# Patient Record
Sex: FEMALE | Race: BLACK OR AFRICAN AMERICAN | NOT HISPANIC OR LATINO | URBAN - METROPOLITAN AREA
[De-identification: names, ages, dates, MRNs, and addresses within clinical notes are randomized per-mention and may not be internally consistent; named-entity substitution may affect disease eponyms.]

---

## 2023-10-02 ENCOUNTER — INPATIENT (INPATIENT)
Facility: HOSPITAL | Age: 43
LOS: 3 days | Discharge: HOME | End: 2023-10-06
Attending: INTERNAL MEDICINE | Admitting: INTERNAL MEDICINE
Payer: COMMERCIAL

## 2023-10-02 VITALS — WEIGHT: 238.1 LBS | HEIGHT: 70 IN

## 2023-10-02 DIAGNOSIS — R93.0 ABNORMAL FINDINGS ON DIAGNOSTIC IMAGING OF SKULL AND HEAD, NOT ELSEWHERE CLASSIFIED: ICD-10-CM

## 2023-10-02 DIAGNOSIS — I25.10 ATHEROSCLEROTIC HEART DISEASE OF NATIVE CORONARY ARTERY WITHOUT ANGINA PECTORIS: ICD-10-CM

## 2023-10-02 DIAGNOSIS — R55 SYNCOPE AND COLLAPSE: ICD-10-CM

## 2023-10-02 DIAGNOSIS — R09.89 OTHER SPECIFIED SYMPTOMS AND SIGNS INVOLVING THE CIRCULATORY AND RESPIRATORY SYSTEMS: ICD-10-CM

## 2023-10-02 DIAGNOSIS — E78.5 HYPERLIPIDEMIA, UNSPECIFIED: ICD-10-CM

## 2023-10-02 DIAGNOSIS — Z86.718 PERSONAL HISTORY OF OTHER VENOUS THROMBOSIS AND EMBOLISM: ICD-10-CM

## 2023-10-02 DIAGNOSIS — I10 ESSENTIAL (PRIMARY) HYPERTENSION: ICD-10-CM

## 2023-10-02 DIAGNOSIS — E11.65 TYPE 2 DIABETES MELLITUS WITH HYPERGLYCEMIA: ICD-10-CM

## 2023-10-02 DIAGNOSIS — R79.89 OTHER SPECIFIED ABNORMAL FINDINGS OF BLOOD CHEMISTRY: ICD-10-CM

## 2023-10-02 LAB
ALBUMIN SERPL ELPH-MCNC: 3.4 G/DL — SIGNIFICANT CHANGE UP (ref 3.3–5)
ALP SERPL-CCNC: 78 U/L — SIGNIFICANT CHANGE UP (ref 40–120)
ALT FLD-CCNC: 16 U/L — SIGNIFICANT CHANGE UP (ref 12–78)
ANION GAP SERPL CALC-SCNC: 6 MMOL/L — SIGNIFICANT CHANGE UP (ref 5–17)
APPEARANCE UR: CLEAR — SIGNIFICANT CHANGE UP
APTT BLD: 25 SEC — SIGNIFICANT CHANGE UP (ref 24.5–35.6)
AST SERPL-CCNC: 23 U/L — SIGNIFICANT CHANGE UP (ref 15–37)
BASOPHILS # BLD AUTO: 0.04 K/UL — SIGNIFICANT CHANGE UP (ref 0–0.2)
BASOPHILS NFR BLD AUTO: 0.6 % — SIGNIFICANT CHANGE UP (ref 0–2)
BILIRUB SERPL-MCNC: 0.2 MG/DL — SIGNIFICANT CHANGE UP (ref 0.2–1.2)
BILIRUB UR-MCNC: NEGATIVE — SIGNIFICANT CHANGE UP
BUN SERPL-MCNC: 13 MG/DL — SIGNIFICANT CHANGE UP (ref 7–23)
CALCIUM SERPL-MCNC: 8.9 MG/DL — SIGNIFICANT CHANGE UP (ref 8.5–10.1)
CHLORIDE SERPL-SCNC: 101 MMOL/L — SIGNIFICANT CHANGE UP (ref 96–108)
CK MB BLD-MCNC: 2.8 % — SIGNIFICANT CHANGE UP (ref 0–3.5)
CK MB BLD-MCNC: 3.4 % — SIGNIFICANT CHANGE UP (ref 0–3.5)
CK MB CFR SERPL CALC: 3 NG/ML — SIGNIFICANT CHANGE UP (ref 0.5–3.6)
CK MB CFR SERPL CALC: 3.8 NG/ML — HIGH (ref 0.5–3.6)
CK SERPL-CCNC: 107 U/L — SIGNIFICANT CHANGE UP (ref 26–192)
CK SERPL-CCNC: 113 U/L — SIGNIFICANT CHANGE UP (ref 26–192)
CO2 SERPL-SCNC: 25 MMOL/L — SIGNIFICANT CHANGE UP (ref 22–31)
COLOR SPEC: YELLOW — SIGNIFICANT CHANGE UP
CREAT SERPL-MCNC: 1.09 MG/DL — SIGNIFICANT CHANGE UP (ref 0.5–1.3)
DIFF PNL FLD: ABNORMAL
EGFR: 64 ML/MIN/1.73M2 — SIGNIFICANT CHANGE UP
EOSINOPHIL # BLD AUTO: 0.11 K/UL — SIGNIFICANT CHANGE UP (ref 0–0.5)
EOSINOPHIL NFR BLD AUTO: 1.5 % — SIGNIFICANT CHANGE UP (ref 0–6)
GLUCOSE BLDC GLUCOMTR-MCNC: 254 MG/DL — HIGH (ref 70–99)
GLUCOSE BLDC GLUCOMTR-MCNC: 276 MG/DL — HIGH (ref 70–99)
GLUCOSE BLDC GLUCOMTR-MCNC: 291 MG/DL — HIGH (ref 70–99)
GLUCOSE SERPL-MCNC: 359 MG/DL — HIGH (ref 70–99)
GLUCOSE UR QL: 1000 MG/DL
HCG SERPL-ACNC: <1 MIU/ML — SIGNIFICANT CHANGE UP
HCT VFR BLD CALC: 35.6 % — SIGNIFICANT CHANGE UP (ref 34.5–45)
HGB BLD-MCNC: 11.8 G/DL — SIGNIFICANT CHANGE UP (ref 11.5–15.5)
IMM GRANULOCYTES NFR BLD AUTO: 0.4 % — SIGNIFICANT CHANGE UP (ref 0–0.9)
INR BLD: 0.93 RATIO — SIGNIFICANT CHANGE UP (ref 0.85–1.18)
KETONES UR-MCNC: NEGATIVE — SIGNIFICANT CHANGE UP
LEUKOCYTE ESTERASE UR-ACNC: NEGATIVE — SIGNIFICANT CHANGE UP
LYMPHOCYTES # BLD AUTO: 1.69 K/UL — SIGNIFICANT CHANGE UP (ref 1–3.3)
LYMPHOCYTES # BLD AUTO: 23.2 % — SIGNIFICANT CHANGE UP (ref 13–44)
MCHC RBC-ENTMCNC: 29.6 PG — SIGNIFICANT CHANGE UP (ref 27–34)
MCHC RBC-ENTMCNC: 33.1 G/DL — SIGNIFICANT CHANGE UP (ref 32–36)
MCV RBC AUTO: 89.4 FL — SIGNIFICANT CHANGE UP (ref 80–100)
MONOCYTES # BLD AUTO: 0.55 K/UL — SIGNIFICANT CHANGE UP (ref 0–0.9)
MONOCYTES NFR BLD AUTO: 7.6 % — SIGNIFICANT CHANGE UP (ref 2–14)
NEUTROPHILS # BLD AUTO: 4.85 K/UL — SIGNIFICANT CHANGE UP (ref 1.8–7.4)
NEUTROPHILS NFR BLD AUTO: 66.7 % — SIGNIFICANT CHANGE UP (ref 43–77)
NITRITE UR-MCNC: NEGATIVE — SIGNIFICANT CHANGE UP
NRBC # BLD: 0 /100 WBCS — SIGNIFICANT CHANGE UP (ref 0–0)
NT-PROBNP SERPL-SCNC: 985 PG/ML — HIGH (ref 0–125)
PH UR: 5 — SIGNIFICANT CHANGE UP (ref 5–8)
PLATELET # BLD AUTO: 287 K/UL — SIGNIFICANT CHANGE UP (ref 150–400)
POTASSIUM SERPL-MCNC: 5 MMOL/L — SIGNIFICANT CHANGE UP (ref 3.5–5.3)
POTASSIUM SERPL-SCNC: 5 MMOL/L — SIGNIFICANT CHANGE UP (ref 3.5–5.3)
PROT SERPL-MCNC: 8 GM/DL — SIGNIFICANT CHANGE UP (ref 6–8.3)
PROT UR-MCNC: 100 MG/DL
PROTHROM AB SERPL-ACNC: 11.1 SEC — SIGNIFICANT CHANGE UP (ref 9.5–13)
RBC # BLD: 3.98 M/UL — SIGNIFICANT CHANGE UP (ref 3.8–5.2)
RBC # FLD: 12.5 % — SIGNIFICANT CHANGE UP (ref 10.3–14.5)
RBC CASTS # UR COMP ASSIST: NEGATIVE /HPF — SIGNIFICANT CHANGE UP (ref 0–4)
SODIUM SERPL-SCNC: 132 MMOL/L — LOW (ref 135–145)
SP GR SPEC: 1.02 — SIGNIFICANT CHANGE UP (ref 1.01–1.02)
TROPONIN I, HIGH SENSITIVITY RESULT: 321.3 NG/L — HIGH
TROPONIN I, HIGH SENSITIVITY RESULT: 91.5 NG/L — HIGH
UROBILINOGEN FLD QL: NEGATIVE MG/DL — SIGNIFICANT CHANGE UP
WBC # BLD: 7.27 K/UL — SIGNIFICANT CHANGE UP (ref 3.8–10.5)
WBC # FLD AUTO: 7.27 K/UL — SIGNIFICANT CHANGE UP (ref 3.8–10.5)
WBC UR QL: NEGATIVE — SIGNIFICANT CHANGE UP

## 2023-10-02 PROCEDURE — 99223 1ST HOSP IP/OBS HIGH 75: CPT

## 2023-10-02 PROCEDURE — 93010 ELECTROCARDIOGRAM REPORT: CPT

## 2023-10-02 PROCEDURE — 71045 X-RAY EXAM CHEST 1 VIEW: CPT | Mod: 26

## 2023-10-02 PROCEDURE — 99285 EMERGENCY DEPT VISIT HI MDM: CPT

## 2023-10-02 PROCEDURE — 93971 EXTREMITY STUDY: CPT | Mod: 26,LT

## 2023-10-02 PROCEDURE — 72125 CT NECK SPINE W/O DYE: CPT | Mod: 26,MA

## 2023-10-02 PROCEDURE — 70486 CT MAXILLOFACIAL W/O DYE: CPT | Mod: 26,MA

## 2023-10-02 PROCEDURE — 70450 CT HEAD/BRAIN W/O DYE: CPT | Mod: 26,MA

## 2023-10-02 PROCEDURE — 71275 CT ANGIOGRAPHY CHEST: CPT | Mod: 26

## 2023-10-02 RX ORDER — ACETAMINOPHEN 500 MG
650 TABLET ORAL EVERY 6 HOURS
Refills: 0 | Status: DISCONTINUED | OUTPATIENT
Start: 2023-10-02 | End: 2023-10-06

## 2023-10-02 RX ORDER — DEXTROSE 50 % IN WATER 50 %
25 SYRINGE (ML) INTRAVENOUS ONCE
Refills: 0 | Status: DISCONTINUED | OUTPATIENT
Start: 2023-10-02 | End: 2023-10-06

## 2023-10-02 RX ORDER — ENOXAPARIN SODIUM 100 MG/ML
40 INJECTION SUBCUTANEOUS EVERY 24 HOURS
Refills: 0 | Status: DISCONTINUED | OUTPATIENT
Start: 2023-10-02 | End: 2023-10-02

## 2023-10-02 RX ORDER — DEXTROSE 50 % IN WATER 50 %
15 SYRINGE (ML) INTRAVENOUS ONCE
Refills: 0 | Status: DISCONTINUED | OUTPATIENT
Start: 2023-10-02 | End: 2023-10-06

## 2023-10-02 RX ORDER — SODIUM CHLORIDE 9 MG/ML
1000 INJECTION, SOLUTION INTRAVENOUS
Refills: 0 | Status: DISCONTINUED | OUTPATIENT
Start: 2023-10-02 | End: 2023-10-06

## 2023-10-02 RX ORDER — LISINOPRIL 2.5 MG/1
40 TABLET ORAL DAILY
Refills: 0 | Status: DISCONTINUED | OUTPATIENT
Start: 2023-10-02 | End: 2023-10-04

## 2023-10-02 RX ORDER — ASPIRIN/CALCIUM CARB/MAGNESIUM 324 MG
1 TABLET ORAL
Refills: 0 | DISCHARGE

## 2023-10-02 RX ORDER — ENOXAPARIN SODIUM 100 MG/ML
110 INJECTION SUBCUTANEOUS EVERY 12 HOURS
Refills: 0 | Status: DISCONTINUED | OUTPATIENT
Start: 2023-10-02 | End: 2023-10-05

## 2023-10-02 RX ORDER — GLUCAGON INJECTION, SOLUTION 0.5 MG/.1ML
1 INJECTION, SOLUTION SUBCUTANEOUS ONCE
Refills: 0 | Status: DISCONTINUED | OUTPATIENT
Start: 2023-10-02 | End: 2023-10-06

## 2023-10-02 RX ORDER — ASPIRIN/CALCIUM CARB/MAGNESIUM 324 MG
81 TABLET ORAL DAILY
Refills: 0 | Status: DISCONTINUED | OUTPATIENT
Start: 2023-10-02 | End: 2023-10-06

## 2023-10-02 RX ORDER — METOPROLOL TARTRATE 50 MG
1 TABLET ORAL
Refills: 0 | DISCHARGE

## 2023-10-02 RX ORDER — SIMVASTATIN 20 MG/1
20 TABLET, FILM COATED ORAL AT BEDTIME
Refills: 0 | Status: DISCONTINUED | OUTPATIENT
Start: 2023-10-02 | End: 2023-10-06

## 2023-10-02 RX ORDER — INSULIN LISPRO 100/ML
VIAL (ML) SUBCUTANEOUS AT BEDTIME
Refills: 0 | Status: DISCONTINUED | OUTPATIENT
Start: 2023-10-02 | End: 2023-10-06

## 2023-10-02 RX ORDER — METOPROLOL TARTRATE 50 MG
50 TABLET ORAL DAILY
Refills: 0 | Status: DISCONTINUED | OUTPATIENT
Start: 2023-10-02 | End: 2023-10-06

## 2023-10-02 RX ORDER — INSULIN LISPRO 100/ML
VIAL (ML) SUBCUTANEOUS
Refills: 0 | Status: DISCONTINUED | OUTPATIENT
Start: 2023-10-02 | End: 2023-10-06

## 2023-10-02 RX ORDER — DEXTROSE 50 % IN WATER 50 %
12.5 SYRINGE (ML) INTRAVENOUS ONCE
Refills: 0 | Status: DISCONTINUED | OUTPATIENT
Start: 2023-10-02 | End: 2023-10-06

## 2023-10-02 RX ORDER — SODIUM CHLORIDE 9 MG/ML
1000 INJECTION, SOLUTION INTRAVENOUS
Refills: 0 | Status: DISCONTINUED | OUTPATIENT
Start: 2023-10-02 | End: 2023-10-02

## 2023-10-02 RX ORDER — LANOLIN ALCOHOL/MO/W.PET/CERES
3 CREAM (GRAM) TOPICAL AT BEDTIME
Refills: 0 | Status: DISCONTINUED | OUTPATIENT
Start: 2023-10-02 | End: 2023-10-06

## 2023-10-02 RX ORDER — LISINOPRIL 2.5 MG/1
1 TABLET ORAL
Refills: 0 | DISCHARGE

## 2023-10-02 RX ORDER — SIMVASTATIN 20 MG/1
1 TABLET, FILM COATED ORAL
Refills: 0 | DISCHARGE

## 2023-10-02 RX ADMIN — SIMVASTATIN 20 MILLIGRAM(S): 20 TABLET, FILM COATED ORAL at 23:51

## 2023-10-02 RX ADMIN — Medication 81 MILLIGRAM(S): at 19:40

## 2023-10-02 RX ADMIN — Medication 6: at 18:36

## 2023-10-02 RX ADMIN — ENOXAPARIN SODIUM 110 MILLIGRAM(S): 100 INJECTION SUBCUTANEOUS at 23:49

## 2023-10-02 RX ADMIN — Medication 50 MILLIGRAM(S): at 23:51

## 2023-10-02 RX ADMIN — LISINOPRIL 40 MILLIGRAM(S): 2.5 TABLET ORAL at 23:54

## 2023-10-02 NOTE — H&P ADULT - PROBLEM SELECTOR PLAN 4
CT head/maxillofacial/Cspine  ( I personally review) Noted indeterminate lucent lesion within the right side of the clivus and basiocciput with surrounding sclerotic change. Additional indeterminate expansile lucent lesion within the right parietal calvarium. Infection and neoplasm are within the differential diagnosis for both of these lesions. Metastatic lesions cannot be excluded  MRI brain/C spine with IV contrast, IV ativan for sedation. Patient has claustrophobia   Pending CTA chest to R/O PE  Consider hematology consult in AM CT head/maxillofacial/Cspine  ( I personally review) Noted indeterminate lucent lesion within the right side of the clivus and basiocciput with surrounding sclerotic change. Additional indeterminate expansile lucent lesion within the right parietal calvarium. Infection and neoplasm are within the differential diagnosis for both of these lesions. Metastatic lesions cannot be excluded  MRI brain/C spine with IV contrast, IV ativan for sedation. Patient has claustrophobia   Pending CTA chest to R/O PE, will be able to evaluate chest pathology as well  Consider hematology/oncology consult in AM

## 2023-10-02 NOTE — ED ADULT NURSE NOTE - ED STAT RN HANDOFF DETAILS
Report given to receiving RN Jordyn ,pts history, current condition and reason for admission discussed, safety concerns addressed and reviewed, pt currently in stable condition, IV flushes for patency and site shows no signs or symptoms of infiltrate, dressing is clean dry and intact, pt is aware of plan of care. Pt education deemed successful at time of report after patient demonstrates successful teach back for proficiency.

## 2023-10-02 NOTE — H&P ADULT - PROBLEM SELECTOR PLAN 3
glucose elevated at 359 in BMP  Patient is not checking FS, only on metformin 1g bid at home  Check A1c  ISS and hypoglycemia protocol  If persistently elevated or A1c is significantly elevated, will then add basal and bolus insulin glucose elevated at 359 in BMP  Patient is not checking FS, only on metformin 1g bid at home  Check A1c  ISS and hypoglycemia protocol  If persistently elevated glucose or A1c is significantly elevated, will then add basal and bolus insulin

## 2023-10-02 NOTE — H&P ADULT - PROBLEM SELECTOR PLAN 2
hsTnT 91.5  EKG with sinus tachy. ? septal infarct  No chest pain  Patient reported h/o nonobstructive CAD, had Cath in around [id number] at [facility] with 20% stenosis, no stent. Patient is following with outpatient cardiology and had normal exercise treadmill test in 2022 per patient  continue aspirin, statin, BB  serial trop/CK/CKMB, ECHO, telemetry monitoring

## 2023-10-02 NOTE — ED ADULT NURSE NOTE - OBJECTIVE STATEMENT
As per pt she was on her way to the dentist when she felt light headed, warm sensation over her body and syncopized to the floor hitting her head. Was assisted up but then fell again as she was entering her uber, feels more SOB than usual. Denies CP, denies headache but states feels tired than usual. Pt has hx of HTN and diabetes, stats compliant with medication, presents to ED AAOX4.

## 2023-10-02 NOTE — H&P ADULT - HISTORY OF PRESENT ILLNESS
43 years old female with h/o HTN, HLD, DM, CAD, h/o DVT 2007 ( due to OC pills, treated with coumadin x 6 months, d/c by hematology, obesity present to ED with complain of syncope x 2. While walking to dental appointment, patient felt blurry vision, lightheadedness, fell forward and had once syncope episode. Patient got up, walked short distance to her Uber, then had another episode. No chest pain, diaphoresis. Patient has polydipsia recently.  Tachycardic upon arrival, afebrile, sat well at [facility]. EKG with sinus tachy. ? septal infarct. No leukocytosis, plty 287, K 5, Cr 1.09, hsTnT 91.5. CXR with no focal consolidation. Left LE doppler negative for DVT. CT head/maxillofacial/Cspine with no acute fracture or intracranial hemorrhage. Noted indeterminate lucent lesion within the right side of the clivus and basiocciput with surrounding sclerotic change. Additional indeterminate expansile lucent lesion within the right parietal calvarium. Infection and neoplasm are within the differential diagnosis for both of these lesions. Metastatic lesions cannot be excluded    SH: occasional alcohol use  FH: HTN, DM

## 2023-10-02 NOTE — H&P ADULT - NSHPPHYSICALEXAM_GEN_ALL_CORE
CONSTITUTIONAL: alert and cooperative, no acute distress.   EYES: PERRL, no scleral icterus  ENT: Mucosa moist, tongue normal.  NECK: Neck supple, trachea midline, non-tender  CARDIAC: Normal S1 and S2. Regular rate and rhythms. No Pedal edema. Peripheral pulses intact  LUNGS: Equal air entry both lungs. No rales, rhonchi, wheezing. Normal respiratory effort.   ABDOMEN: Soft, nondistended, nontender. No guarding or rebound tenderness. No hepatomegaly or splenomegaly. Bowel sound normal.   MUSCULOSKELETAL: Normocephalic, atraumatic. No significant deformity or joint abnormality  NEUROLOGICAL: No gross motor or sensory deficits.  SKIN: no lesions or eruptions. Normal turgor  PSYCHIATRIC: A&O x 3, appropriate mood and affect

## 2023-10-02 NOTE — ED PROVIDER NOTE - CLINICAL SUMMARY MEDICAL DECISION MAKING FREE TEXT BOX
43 year old female w h/o htn, dm, mi, dvt (2007 due to bcp, no longer on anticoagulation) and left side heart failure presents today biba c/o syncope, pt states that she was going to her dentist appointment, just finished talking to her family on the phone when she started to experience blurry vision and leg weakness and syncopized, pt fell face down, was assisted up, as she was attempting to get into an Uber she experienced another syncopal episode, pt recalls touching the knob of the car then woke up with people asking if she was ok, pts bp elevated and blood sugar elevated with ems, pt admits to not taking her medications this morning and last ate at 9pm yesterday, +headache initially, now resolved, (-) dizziness +sob +nausea  (-) vomiting (-) chest pain (-) flu like symptoms (-) recent travel +chronic R leg swelling (due to prior DVT) (-) hormonal medications, on exam pt is alert and oriented x3, moving all extremities, able to follow commands and appears in no distress, DDX includes vasovagal event, arrhythmias, dehydration, severe anemia, ruptured ectopic pregnancy, PE, labs ordered, pt hydrated, cardiac monitoring, will reassess and dispo     labs reviewed, ct head negative, pregnance negative, ct angio positive for PE, Pt admitted for treatment and workup

## 2023-10-02 NOTE — ED ADULT NURSE NOTE - NSFALLRISKINTERV_ED_ALL_ED

## 2023-10-02 NOTE — ED PROVIDER NOTE - SECONDARY DIAGNOSIS.
Elevated troponin Pulmonary embolism Type 2 diabetes mellitus with hyperglycemia CAD (coronary artery disease) Hyperlipidemia, unspecified Benign essential HTN

## 2023-10-02 NOTE — H&P ADULT - ASSESSMENT
43 years old female with h/o HTN, HLD, DM, CAD, h/o DVT 2007 ( due to OC pills, treated with coumadin x 6 months, d/c by hematology, obesity present to ED with complain of syncope x 2. While walking to dental appointment, patient felt blurry vision, lightheadedness, fell forward and had once syncope episode. Patient got up, walked short distance to her Uber, then had another episode. No chest pain, diaphoresis. Patient has polydipsia recently.  Tachycardic upon arrival, afebrile, sat well at [facility]. EKG with sinus tachy. ? septal infarct. No leukocytosis, plty 287, K 5, Cr 1.09, hsTnT 91.5. CXR with no focal consolidation. Left LE doppler negative for DVT. CT head/maxillofacial/Cspine with no acute fracture or intracranial hemorrhage. Noted indeterminate lucent lesion within the right side of the clivus and basiocciput with surrounding sclerotic change. Additional indeterminate expansile lucent lesion within the right parietal calvarium. Infection and neoplasm are within the differential diagnosis for both of these lesions. Metastatic lesions cannot be excluded

## 2023-10-02 NOTE — H&P ADULT - PROBLEM SELECTOR PLAN 8
h/o left LE DVT 2007 due to OCP. Patient was following with hematologist. Completed 6 months of coumadin and was stopped by hematology

## 2023-10-02 NOTE — ED PROVIDER NOTE - OBJECTIVE STATEMENT
43 year old female presents today biba c/o syncope, pt states that she was going to her dentist appointment, just finished talking to her family on the phone when she started to feel 43 year old female w h/o htn, dm, mi, dvt (2007 due to bcp, no longer on anticoagulation) and left side heart failure presents today biba c/o syncope, pt states that she was going to her dentist appointment, just finished talking to her family on the phone when she started to experience blurry vision and leg weakness and syncopized, pt fell face down, was assisted up, as she was attempting to get into an Uber she experienced another syncopal episode, pt recalls touching the knob of the car then woke up with people asking if she was ok, pts bp elevated and blood sugar elevated with ems, pt admits to not taking her medications this morning and last ate at 9pm yesterday, +headache initially, now resolved, (-) dizziness +sob +nausea  (-) vomiting (-) chest pain (-) flu like symptoms (-) recent travel +chronic R leg swelling (due to prior DVT) (-) hormonal medications

## 2023-10-02 NOTE — H&P ADULT - PROBLEM SELECTOR PLAN 7
Patient reported h/o nonobstructive CAD, had Cath in around [id number] at [facility] with 20% stenosis, no stent. Patient is following with outpatient cardiology and had normal exercise treadmill test in 2022 per patient  continue aspirin, statin, BB

## 2023-10-02 NOTE — H&P ADULT - PROBLEM SELECTOR PLAN 1
present with syncope x 2   CT head/C spine  ( I personally review) with no acute pathology or intracranial pathology  Presentation suggestive of vasovagal  Check orthostatic vitals  CTA chest to R/O PE given h/o prior DVT  Gentle IV hydration, monitor for volume overload  Telemetry monitoring, ECHO present with syncope x 2   CT head/C spine  ( I personally review) with no acute pathology or intracranial pathology  Presentation suggestive of vasovagal  Check orthostatic vitals, if positive, will consider IV fluid. Will hold of IV fluid for now given elevated BNP  CTA chest to R/O PE given h/o prior DVT  Telemetry monitoring, ECHO

## 2023-10-02 NOTE — ED PROVIDER NOTE - CARE PLAN
1 Principal Discharge DX:	Syncope   Principal Discharge DX:	Syncope  Secondary Diagnosis:	Elevated troponin   Principal Discharge DX:	Syncope  Secondary Diagnosis:	Elevated troponin  Secondary Diagnosis:	Type 2 diabetes mellitus with hyperglycemia  Secondary Diagnosis:	Hyperlipidemia, unspecified  Secondary Diagnosis:	CAD (coronary artery disease)  Secondary Diagnosis:	Benign essential HTN  Secondary Diagnosis:	Pulmonary embolism

## 2023-10-03 DIAGNOSIS — I26.99 OTHER PULMONARY EMBOLISM WITHOUT ACUTE COR PULMONALE: ICD-10-CM

## 2023-10-03 LAB
A1C WITH ESTIMATED AVERAGE GLUCOSE RESULT: 12.3 % — HIGH (ref 4–5.6)
A1C WITH ESTIMATED AVERAGE GLUCOSE RESULT: 12.3 % — HIGH (ref 4–5.6)
ALBUMIN SERPL ELPH-MCNC: 3.2 G/DL — LOW (ref 3.3–5)
ALP SERPL-CCNC: 64 U/L — SIGNIFICANT CHANGE UP (ref 40–120)
ALT FLD-CCNC: 18 U/L — SIGNIFICANT CHANGE UP (ref 12–78)
ANION GAP SERPL CALC-SCNC: 9 MMOL/L — SIGNIFICANT CHANGE UP (ref 5–17)
APTT BLD: 31.7 SEC — SIGNIFICANT CHANGE UP (ref 24.5–35.6)
AST SERPL-CCNC: 22 U/L — SIGNIFICANT CHANGE UP (ref 15–37)
BILIRUB SERPL-MCNC: 0.3 MG/DL — SIGNIFICANT CHANGE UP (ref 0.2–1.2)
BUN SERPL-MCNC: 19 MG/DL — SIGNIFICANT CHANGE UP (ref 7–23)
CALCIUM SERPL-MCNC: 8.8 MG/DL — SIGNIFICANT CHANGE UP (ref 8.5–10.1)
CHLORIDE SERPL-SCNC: 99 MMOL/L — SIGNIFICANT CHANGE UP (ref 96–108)
CHOLEST SERPL-MCNC: 140 MG/DL — SIGNIFICANT CHANGE UP
CK MB BLD-MCNC: 2.6 % — SIGNIFICANT CHANGE UP (ref 0–3.5)
CK MB CFR SERPL CALC: 2.8 NG/ML — SIGNIFICANT CHANGE UP (ref 0.5–3.6)
CK SERPL-CCNC: 107 U/L — SIGNIFICANT CHANGE UP (ref 26–192)
CO2 SERPL-SCNC: 25 MMOL/L — SIGNIFICANT CHANGE UP (ref 22–31)
CREAT SERPL-MCNC: 1.28 MG/DL — SIGNIFICANT CHANGE UP (ref 0.5–1.3)
EGFR: 53 ML/MIN/1.73M2 — LOW
ESTIMATED AVERAGE GLUCOSE: 306 MG/DL — HIGH (ref 68–114)
ESTIMATED AVERAGE GLUCOSE: 306 MG/DL — HIGH (ref 68–114)
GLUCOSE BLDC GLUCOMTR-MCNC: 252 MG/DL — HIGH (ref 70–99)
GLUCOSE BLDC GLUCOMTR-MCNC: 255 MG/DL — HIGH (ref 70–99)
GLUCOSE BLDC GLUCOMTR-MCNC: 255 MG/DL — HIGH (ref 70–99)
GLUCOSE BLDC GLUCOMTR-MCNC: 288 MG/DL — HIGH (ref 70–99)
GLUCOSE BLDC GLUCOMTR-MCNC: 407 MG/DL — HIGH (ref 70–99)
GLUCOSE SERPL-MCNC: 287 MG/DL — HIGH (ref 70–99)
HCT VFR BLD CALC: 34.7 % — SIGNIFICANT CHANGE UP (ref 34.5–45)
HDLC SERPL-MCNC: 33 MG/DL — LOW
HGB BLD-MCNC: 11.3 G/DL — LOW (ref 11.5–15.5)
IGA FLD-MCNC: 323 MG/DL — SIGNIFICANT CHANGE UP (ref 84–499)
IGG FLD-MCNC: 1737 MG/DL — HIGH (ref 610–1660)
IGM SERPL-MCNC: 48 MG/DL — SIGNIFICANT CHANGE UP (ref 35–242)
KAPPA LC SER QL IFE: 6.21 MG/DL — HIGH (ref 0.33–1.94)
KAPPA/LAMBDA FREE LIGHT CHAIN RATIO, SERUM: 2.11 RATIO — HIGH (ref 0.26–1.65)
LAMBDA LC SER QL IFE: 2.95 MG/DL — HIGH (ref 0.57–2.63)
LIPID PNL WITH DIRECT LDL SERPL: 73 MG/DL — SIGNIFICANT CHANGE UP
MAGNESIUM SERPL-MCNC: 1.2 MG/DL — LOW (ref 1.6–2.6)
MCHC RBC-ENTMCNC: 29.1 PG — SIGNIFICANT CHANGE UP (ref 27–34)
MCHC RBC-ENTMCNC: 32.6 G/DL — SIGNIFICANT CHANGE UP (ref 32–36)
MCV RBC AUTO: 89.4 FL — SIGNIFICANT CHANGE UP (ref 80–100)
NON HDL CHOLESTEROL: 107 MG/DL — SIGNIFICANT CHANGE UP
NRBC # BLD: 0 /100 WBCS — SIGNIFICANT CHANGE UP (ref 0–0)
PHOSPHATE SERPL-MCNC: 3.9 MG/DL — SIGNIFICANT CHANGE UP (ref 2.5–4.5)
PLATELET # BLD AUTO: 277 K/UL — SIGNIFICANT CHANGE UP (ref 150–400)
POTASSIUM SERPL-MCNC: 4.7 MMOL/L — SIGNIFICANT CHANGE UP (ref 3.5–5.3)
POTASSIUM SERPL-SCNC: 4.7 MMOL/L — SIGNIFICANT CHANGE UP (ref 3.5–5.3)
PROT SERPL-MCNC: 7.3 G/DL — SIGNIFICANT CHANGE UP (ref 6–8.3)
PROT SERPL-MCNC: 7.3 G/DL — SIGNIFICANT CHANGE UP (ref 6–8.3)
PROT SERPL-MCNC: 8.2 GM/DL — SIGNIFICANT CHANGE UP (ref 6–8.3)
RBC # BLD: 3.88 M/UL — SIGNIFICANT CHANGE UP (ref 3.8–5.2)
RBC # FLD: 12.8 % — SIGNIFICANT CHANGE UP (ref 10.3–14.5)
SODIUM SERPL-SCNC: 133 MMOL/L — LOW (ref 135–145)
T4 FREE SERPL-MCNC: 1.2 NG/DL — SIGNIFICANT CHANGE UP (ref 0.9–1.8)
TRIGL SERPL-MCNC: 201 MG/DL — HIGH
TROPONIN I, HIGH SENSITIVITY RESULT: 311.6 NG/L — HIGH
TROPONIN I, HIGH SENSITIVITY RESULT: 332.1 NG/L — HIGH
TSH SERPL-MCNC: 1.96 UU/ML — SIGNIFICANT CHANGE UP (ref 0.36–3.74)
WBC # BLD: 8.47 K/UL — SIGNIFICANT CHANGE UP (ref 3.8–10.5)
WBC # FLD AUTO: 8.47 K/UL — SIGNIFICANT CHANGE UP (ref 3.8–10.5)

## 2023-10-03 PROCEDURE — 70552 MRI BRAIN STEM W/DYE: CPT | Mod: 26

## 2023-10-03 PROCEDURE — 99232 SBSQ HOSP IP/OBS MODERATE 35: CPT

## 2023-10-03 PROCEDURE — 93306 TTE W/DOPPLER COMPLETE: CPT | Mod: 26

## 2023-10-03 PROCEDURE — 72142 MRI NECK SPINE W/DYE: CPT | Mod: 26

## 2023-10-03 RX ORDER — INSULIN GLARGINE 100 [IU]/ML
10 INJECTION, SOLUTION SUBCUTANEOUS AT BEDTIME
Refills: 0 | Status: DISCONTINUED | OUTPATIENT
Start: 2023-10-03 | End: 2023-10-04

## 2023-10-03 RX ORDER — INFLUENZA VIRUS VACCINE 15; 15; 15; 15 UG/.5ML; UG/.5ML; UG/.5ML; UG/.5ML
0.5 SUSPENSION INTRAMUSCULAR ONCE
Refills: 0 | Status: COMPLETED | OUTPATIENT
Start: 2023-10-03 | End: 2023-10-06

## 2023-10-03 RX ORDER — ALPRAZOLAM 0.25 MG
0.25 TABLET ORAL ONCE
Refills: 0 | Status: DISCONTINUED | OUTPATIENT
Start: 2023-10-03 | End: 2023-10-03

## 2023-10-03 RX ADMIN — Medication 650 MILLIGRAM(S): at 22:38

## 2023-10-03 RX ADMIN — ENOXAPARIN SODIUM 110 MILLIGRAM(S): 100 INJECTION SUBCUTANEOUS at 22:39

## 2023-10-03 RX ADMIN — Medication 12: at 11:35

## 2023-10-03 RX ADMIN — Medication 0.25 MILLIGRAM(S): at 10:25

## 2023-10-03 RX ADMIN — Medication 6: at 17:28

## 2023-10-03 RX ADMIN — Medication 6: at 08:20

## 2023-10-03 RX ADMIN — INSULIN GLARGINE 10 UNIT(S): 100 INJECTION, SOLUTION SUBCUTANEOUS at 22:20

## 2023-10-03 RX ADMIN — Medication 1: at 22:20

## 2023-10-03 RX ADMIN — Medication 50 MILLIGRAM(S): at 10:28

## 2023-10-03 RX ADMIN — LISINOPRIL 40 MILLIGRAM(S): 2.5 TABLET ORAL at 10:25

## 2023-10-03 RX ADMIN — SIMVASTATIN 20 MILLIGRAM(S): 20 TABLET, FILM COATED ORAL at 22:39

## 2023-10-03 RX ADMIN — ENOXAPARIN SODIUM 110 MILLIGRAM(S): 100 INJECTION SUBCUTANEOUS at 11:31

## 2023-10-03 RX ADMIN — Medication 81 MILLIGRAM(S): at 11:31

## 2023-10-03 NOTE — CONSULT NOTE ADULT - ASSESSMENT
43 years old female with h/o HTN, HLD, DM, CAD, h/o DVT 2007 ( due to BC pills, treated with coumadin x 6 months, d/c by hematology, obesity present to ED with complain of syncope x 2.    #PE  -patient w/ hx of provoked DVT in 2007 r/t birth control as per patient was on coumadin x6 months and then discontinued by hematologist  -denies any other personal or familial hx of blood clots, denies hx of miscarriages, or still births  -US B/L LE- negative for DVT  -pending apls w/u   -patient started on LMWH-further recs dependent on findings     #Malig w/u  -patient presented s/p syncopal episode prior to stated she was in good health.  -denies any weight loss, fever or chills. Stated she still menstruates w/ normal period and is up to date w/ pap smears and mammograms although stated she was told she has dense breast and due for 6 month check up of US). Denies any previous hx of colonoscopy.  -patient does admit to familial hx of bladder cancer from her father   -patient personally assessed not obv masses or nodules noted.  -CTA-C showing PE w/ mild to mod. R heart strain, w/o any enlarged LN's  or obv pulm nodules  -CT-C spine/ maxillofacial and H showing  Indeterminate lucent lesion within the right side of the clivus and basiocciput with surrounding sclerotic change. Additional indeterminate expansile lucent lesion within the right parietal calvarium (infection vs neoplasm)  -pending MRI-H/ c-spine for further eval   -rec CT A/P to r/o any occult malig  -pending spep/chelsea/upep      Thank you for the referral. Will continue to monitor the patient.  Please call with any questions [phone]  Above reviewed with Attending Dr. Manuel ALLEN/[facility]  [address] [facility], [address], [place], [place]  [phone]  *Note not finalized until signed by Attending Physician

## 2023-10-03 NOTE — PATIENT PROFILE ADULT - FALL HARM RISK - HARM RISK INTERVENTIONS

## 2023-10-03 NOTE — CONSULT NOTE ADULT - SUBJECTIVE AND OBJECTIVE BOX
Hematology Consult Note    HPI:  43 years old female with h/o HTN, HLD, DM, CAD, h/o DVT 2007 ( due to BC pills, treated with coumadin x 6 months, d/c by hematology, obesity present to ED with complain of syncope x 2. While walking to dental appointment, patient felt blurry vision, lightheadedness, fell forward and had once syncope episode. Patient got up, walked short distance to her Uber, then had another episode. No chest pain, diaphoresis. Patient has polydipsia recently.  Tachycardic upon arrival, afebrile, sat well at [facility]. EKG with sinus tachy. ? septal infarct. No leukocytosis, plty 287, K 5, Cr 1.09, hsTnT 91.5. CXR with no focal consolidation. Left LE doppler negative for DVT. CT head/maxillofacial/Cspine with no acute fracture or intracranial hemorrhage. Noted indeterminate lucent lesion within the right side of the clivus and basiocciput with surrounding sclerotic change. Additional indeterminate expansile lucent lesion within the right parietal calvarium. Infection and neoplasm are within the differential diagnosis for both of these lesions. Metastatic lesions cannot be excluded    SH: occasional alcohol use  FH: HTN, DM (02 Oct 2023 16:04)      Allergies    No Known Allergies    Intolerances        MEDICATIONS  (STANDING):  aspirin enteric coated 81 milliGRAM(s) Oral daily  dextrose 5%. 1000 milliLiter(s) (50 mL/Hr) IV Continuous <Continuous>  dextrose 5%. 1000 milliLiter(s) (100 mL/Hr) IV Continuous <Continuous>  dextrose 50% Injectable 12.5 Gram(s) IV Push once  dextrose 50% Injectable 25 Gram(s) IV Push once  dextrose 50% Injectable 25 Gram(s) IV Push once  enoxaparin Injectable 110 milliGRAM(s) SubCutaneous every 12 hours  glucagon  Injectable 1 milliGRAM(s) IntraMuscular once  insulin lispro (ADMELOG) corrective regimen sliding scale   SubCutaneous three times a day before meals  insulin lispro (ADMELOG) corrective regimen sliding scale   SubCutaneous at bedtime  lisinopril 40 milliGRAM(s) Oral daily  LORazepam   Injectable 2 milliGRAM(s) IV Push once  metoprolol succinate ER 50 milliGRAM(s) Oral daily  simvastatin 20 milliGRAM(s) Oral at bedtime    MEDICATIONS  (PRN):  acetaminophen     Tablet .. 650 milliGRAM(s) Oral every 6 hours PRN Mild Pain (1 - 3), Moderate Pain (4 - 6)  dextrose Oral Gel 15 Gram(s) Oral once PRN Blood Glucose LESS THAN 70 milliGRAM(s)/deciliter  melatonin 3 milliGRAM(s) Oral at bedtime PRN Insomnia      PAST MEDICAL & SURGICAL HISTORY:  Diabetes      Mild HTN      No significant past surgical history          FAMILY HISTORY:      SOCIAL HISTORY: No EtOH, no tobacco    REVIEW OF SYSTEMS:    CONSTITUTIONAL: has some dizziness when standing coming in w/ syncopal episode x2   EYES/ENT: No visual changes;  No vertigo or throat pain   NECK: No pain or stiffness  RESPIRATORY: RIVERA  CARDIOVASCULAR: No chest pain or palpitations  GASTROINTESTINAL: No abdominal or epigastric pain. No nausea, vomiting, or hematemesis; No diarrhea or constipation. No melena or hematochezia.  GENITOURINARY: No dysuria, frequency or hematuria  NEUROLOGICAL: No numbness or weakness  SKIN: No itching, burning, rashes, or lesions   All other review of systems is negative unless indicated above.        T(F): 98.7 (10-03-23 @ 08:20), Max: 98.9 (10-03-23 @ 02:50)  HR: 100 (10-03-23 @ 08:20)  BP: 138/95 (10-03-23 @ 08:20)  RR: 24 (10-03-23 @ 08:20)  SpO2: 100% (10-03-23 @ 08:20)  Wt(kg): --    GENERAL: some dizziness when standing   HEAD:  Atraumatic, Normocephalic  EYES: EOMI, PERRLA, conjunctiva and sclera clear  NECK: Supple, No JVD  CHEST/LUNG: diminished bl   HEART: Regular rate and rhythm; No murmurs, rubs, or gallops  ABDOMEN:  distended, abd Soft, Nontender, Bowel sounds present  EXTREMITIES:  2+ Peripheral Pulses, No clubbing, cyanosis, or edema  NEUROLOGY: non-focal  SKIN: No rashes or lesions                          11.3   8.47  )-----------( 277      ( 03 Oct 2023 09:15 )             34.7       10-03    133<L>  |  99  |  19  ----------------------------<  287<H>  4.7   |  25  |  1.28    Ca    8.8      03 Oct 2023 09:15  Phos  3.9     10-03  Mg     1.2     10-03    TPro  8.2  /  Alb  3.2<L>  /  TBili  0.3  /  DBili  x   /  AST  22  /  ALT  18  /  AlkPhos  64  10-03      Magnesium: 1.2 mg/dL (10-03 @ 09:15)  Phosphorus: 3.9 mg/dL (10-03 @ 09:15)  < from: CT Angio Chest PE Protocol w/ IV Cont (10.02.23 @ 19:59) >  ACC: [id number] EXAM:  CT ANGIO CHEST PULM ART [facility]   ORDERED BY: ELEANOR LAZO     PROCEDURE DATE:  10/02/2023          INTERPRETATION:  INDICATION: 43 year-old female is evaluated for   pulmonary embolism    TECHNIQUE: Volumetric CT angiographic acquisition of the chest was   obtained from the thoracic inlet to the upper abdomen, after    administration of intravenous contrast using a pulmonary embolus   protocol. 3D MIP and volume-rendered images were created on a separate   workstation. Coronal and sagittal maximum intensity projection images   were performed.    This CT scan was performed with one or more of the following dose   optimization techniques: iterative reconstruction, automatic exposure   control, and/or manual adjustment of mAs and kVp according to the   patient's size.    CONTRAST: 80 cc of iohexol 350    COMPARISON: No prior studies are available for comparison.    FINDINGS:  Lines and tubes: None    Pulmonary arteries: There is a good bolus of contrast in the pulmonary   arterial system. There is opacification through the distal subsegmental   level. There is no respiratory motion artifact. There are multiple   filling defects within the bilateral lobar arteries, extending into   several segmental and subsegmentalbranches. The main pulmonary artery is   normal in size. The heart size is within normal limits. There are   prominence of right ventricle and flattening of intraventricular septum,   suggestive of mild to moderate right-sided strain..    Mediastinum: The thyroid and thoracic inlet are normal. There is no   evidence of enlarged mediastinal, hilar, or axillary lymph nodes.  No   pericardial effusion is present. The esophagus is normal.    Lung: Central tracheobronchial tree is patent. There is no focal   consolidation. There is no suspicious pulmonary nodule/mass. There is no   evidence of pleural-based groundglass opacities to suggest pulmonary   infarct/hemorrhage.    Pleura: No effusions or pneumothorax.    Abdomen: The visualized superior abdomen is normal.    Bone and soft tissue: The osseous structures and visualized soft tissues   demonstrate no acute abnormality.    IMPRESSION:  Multiple filling defects within the bilateral lobar arteries, extending   into several segmental and subsegmental branches. Mild to moderate   right-sided strain.    The clinical finding was communicated with Dr. Cortes at 9:00 PM on   10/2/2023.    --- End of Report ---      < from: CT Head No Cont (10.02.23 @ 13:27) >  ACC: [id number] EXAM:  CT MAXILLOFACIAL   ORDERED BY: ELEANOR LAZO     ACC: [id number] EXAM:  CT BRAIN   ORDERED BY: ELEANOR LAZO     ACC: [id number] EXAM:  CT CERVICAL SPINE   ORDERED BY: ELEANOR LAZO     PROCEDURE DATE:  10/02/2023          INTERPRETATION:  .    CLINICAL INFORMATION: Syncope. Trauma. Pain.    TECHNIQUE: Transaxial CT images were obtained through the cervical spine,   face, and head without the administration of IV contrast. Sagittal and   coronal reformatted images were obtained from the source data.    COMPARISON: None available.    FINDINGS:    NONCONTRAST CERVICAL SPINE CT: No acute fractures or dislocations are   notable. Straightening of the cervical alignment is seen. There is no   loss of vertebral body height. No aggressive osteoblastic or osteolytic   lesions are notable. Minor degenerative disc disease is seen at C5-C6   level. Otherwise, the disc spaces appear unremarkable. The dens is   intact. The lateral masses of C1 are not displaced. There is no   prevertebral soft tissue swelling.    There is no cervical spinal canal or foraminal stenosis.    The imaged soft tissue structures of the neck and lung apices are   unremarkable.    NONCONTRAST MAXILLOFACIAL CT: No acute facial fractures are seen.    The bilateral orbital rims and orbital floors are intact. The bilateral   orbits inclusive of the globes, extraocular muscles, optic nerves, and   orbital fat appear within normal limits.    The nasal bones are intact as well as the nasal septum. There is no nasal   septal hematoma.    The paranasal sinuses and tympanomastoid cavities are clear.    The zygomatic arches, mandible, and maxilla are intact.    Soft tissues are unremarkable.    NONCONTRAST HEAD CT: There is no acute intracranial hemorrhage, mass   effect, shift of the midline structures, herniation, extra-axial fluid   collection, or hydrocephalus.    The calvarium appears intact. An expansile lucent lesion is seen within   the right parietal calvarium measuring up to 2.8 cm in greatest long axis   dimensions. Scalloping of the inner and outer tables of the calvarial   margins is noted.    An additional geographic lucent area within the right side of the clivus   and basiocciput is seen with surrounding sclerosis. Sclerosis extends   nearly to the right occipital condyle.    A fossa navicularis magna is seen.    IMPRESSION:    NONCONTRAST CERVICAL SPINE CT: No acute fractures or dislocations.    NONCONTRAST MAXILLOFACIAL CT: No acute facial fractures.    NONCONTRAST HEAD CT: No acute intracranial hemorrhage, mass effect, or   shift of the midline structures.    Indeterminate lucent lesion within the right side of the clivus and   basiocciput with surrounding sclerotic change. Additional indeterminate   expansile lucent lesion within the right parietal calvarium. Infection   and neoplasm are within the differential diagnosis for both of these   lesions. Metastatic lesions cannot be excluded. Recommend further   evaluation with a contrast-enhanced MRI study of the skull base/brain and   cervical spine, provided there are no MRI contraindications.    --- End of Report ---        < from: US Duplex Venous Lower Ext Ltd, Left (10.02.23 @ 14:24) >  ACC: [id number] EXAM:  US DPLX LWR EXT VEINS LTD LT   ORDERED BY: ELEANOR LAZO     PROCEDURE DATE:  10/02/2023          INTERPRETATION:  CLINICAL INFORMATION: Left leg swelling    COMPARISON: None available.    TECHNIQUE: Duplex sonography of the LEFT LOWER extremity veins with color   and spectral Doppler, with and without compression.    FINDINGS:    There is normal compressibility of the left common femoral, femoral and   popliteal veins.  The contralateral common femoral vein is patent.  Doppler examination shows normal spontaneous and phasic flow.    No calf vein thrombosis is detected.    IMPRESSION:  No evidence of left lower extremity deep venous thrombosis.            --- End of Report ---

## 2023-10-03 NOTE — PROGRESS NOTE ADULT - PROBLEM SELECTOR PLAN 7
Patient reported h/o nonobstructive CAD, had Cath in around [id number] at [facility] with 20% stenosis, no stent. Patient is following with outpatient cardiology and had normal exercise treadmill test in 2022 per patient  continue aspirin, statin, BB

## 2023-10-03 NOTE — CONSULT NOTE ADULT - SUBJECTIVE AND OBJECTIVE BOX
Patient is a 43y old  Female who presents with a chief complaint of syncope, abnormal CT head, elevated trop (03 Oct 2023 11:34)      HPI:  43 years old female with h/o HTN, HLD, DM, CAD, h/o DVT 2007 ( due to OC pills, treated with coumadin x 6 months, d/c by hematology, obesity present to ED with complain of syncope x 2. While walking to dental appointment, patient felt blurry vision, lightheadedness, fell forward and had once syncope episode. Patient got up, walked short distance to her Uber, then had another episode. No chest pain, diaphoresis. Patient has polydipsia recently.  Tachycardic upon arrival, afebrile, sat well at [facility]. EKG with sinus tachy. ? septal infarct. No leukocytosis, plty 287, K 5, Cr 1.09, hsTnT 91.5. CXR with no focal consolidation. Left LE doppler negative for DVT. CT head/maxillofacial/Cspine with no acute fracture or intracranial hemorrhage. Noted indeterminate lucent lesion within the right side of the clivus and basiocciput with surrounding sclerotic change. Additional indeterminate expansile lucent lesion within the right parietal calvarium. Infection and neoplasm are within the differential diagnosis for both of these lesions. Metastatic lesions cannot be excluded    SH: occasional alcohol use  FH: HTN, DM (02 Oct 2023 16:04)      PAST MEDICAL & SURGICAL HISTORY:  Diabetes      Mild HTN      No significant past surgical history          FAMILY HISTORY:    SOCIAL HISTORY:     Allergies  No Known Allergies          MEDICATIONS  (STANDING):  aspirin enteric coated 81 milliGRAM(s) Oral daily  dextrose 5%. 1000 milliLiter(s) (50 mL/Hr) IV Continuous <Continuous>  dextrose 5%. 1000 milliLiter(s) (100 mL/Hr) IV Continuous <Continuous>  dextrose 50% Injectable 12.5 Gram(s) IV Push once  dextrose 50% Injectable 25 Gram(s) IV Push once  dextrose 50% Injectable 25 Gram(s) IV Push once  enoxaparin Injectable 110 milliGRAM(s) SubCutaneous every 12 hours  glucagon  Injectable 1 milliGRAM(s) IntraMuscular once  insulin lispro (ADMELOG) corrective regimen sliding scale   SubCutaneous three times a day before meals  insulin lispro (ADMELOG) corrective regimen sliding scale   SubCutaneous at bedtime  lisinopril 40 milliGRAM(s) Oral daily  LORazepam   Injectable 2 milliGRAM(s) IV Push once  metoprolol succinate ER 50 milliGRAM(s) Oral daily  simvastatin 20 milliGRAM(s) Oral at bedtime    MEDICATIONS  (PRN):  acetaminophen     Tablet .. 650 milliGRAM(s) Oral every 6 hours PRN Mild Pain (1 - 3), Moderate Pain (4 - 6)  dextrose Oral Gel 15 Gram(s) Oral once PRN Blood Glucose LESS THAN 70 milliGRAM(s)/deciliter  melatonin 3 milliGRAM(s) Oral at bedtime PRN Insomnia    REVIEW OF SYSTEMS:    Constitutional:            No fever, weight loss or fatigue  HEENT:                         No difficulty hearing, tinnitus, vertigo; No sinus or throat pain  Respiratory:   Cardiovascular:           No chest pain, palpitations  Gastrointestinal:        No abdominal or epigastric pain. No N/V/diarrhea or hematemesis  Genitourinary:            No dysuria, frequency, hematuria or incontinence  SKIN:                             no rash  Musculoskeletal:        No joint pain or swelling  Extremities:                No swelling  Neurological:              No headaches  Psychiatric:                 No depression, anxiety    PQRS:  Vaccines - Influenza and Pneumovax:  BMI:  Tobacco:  Depression:   Colorectal Screening:  Breast Cancer Screening:  Blood Presssure Screening / Control of:  HbAIc:  Ischemic Vascular Disease:  Current Medications Reviewed:    Vital Signs Last 24 Hrs  T(C): 36.9 (03 Oct 2023 11:58), Max: 37.2 (03 Oct 2023 02:50)  T(F): 98.4 (03 Oct 2023 11:58), Max: 98.9 (03 Oct 2023 02:50)  HR: 105 (03 Oct 2023 11:58) (99 - 115)  BP: 137/91 (03 Oct 2023 11:58) (110/84 - 153/81)  BP(mean): 91 (02 Oct 2023 22:34) (91 - 91)  RR: 21 (03 Oct 2023 11:58) (20 - 24)  SpO2: 96% (03 Oct 2023 11:58) (94% - 100%)    Parameters below as of 03 Oct 2023 11:58  Patient On (Oxygen Delivery Method): room air        PHYSICAL EXAM:  GEN:         Awake, responsive and comfortable.  HEENT:    Normal.    RESP:   CVS:             Regular rate and rhythm.   ABD:         Soft, non-tender, non-distended;   :             No costovertebral angle tenderness  SKIN:           Warm and dry.  EXTR:            No clubbing, cyanosis or edema  CNS:              Intact sensory and motor function.  PSYCH:        cooperative, no anxiety or depression            LABS:                        11.3   8.47  )-----------( 277      ( 03 Oct 2023 09:15 )             34.7     10-03    133<L>  |  99  |  19  ----------------------------<  287<H>  4.7   |  25  |  1.28    Ca    8.8      03 Oct 2023 09:15  Phos  3.9     10-03  Mg     1.2     10-03    TPro  8.2  /  Alb  3.2<L>  /  TBili  0.3  /  DBili  x   /  AST  22  /  ALT  18  /  AlkPhos  64  10-03    PT/INR - ( 02 Oct 2023 11:15 )   PT: 11.1 sec;   INR: 0.93 ratio         PTT - ( 02 Oct 2023 11:15 )  PTT:25.0 sec    Urinalysis Basic - ( 03 Oct 2023 09:15 )    Color: x / Appearance: x / SG: x / pH: x  Gluc: 287 mg/dL / Ketone: x  / Bili: x / Urobili: x   Blood: x / Protein: x / Nitrite: x   Leuk Esterase: x / RBC: x / WBC x   Sq Epi: x / Non Sq Epi: x / Bacteria: x              EKG:     RADIOLOGY & ADDITIONAL STUDIES:    ASSESSMENT AND PLAN:  Patient is a 43y old  Female who presents with a chief complaint of syncope, abnormal CT head, elevated trop (03 Oct 2023 11:34)    HPI:  43 years old female with HTN, HLD, DM, CAD/MI in 2015, LV dysfunction, DVT 2007 ( due to OC pills, treated with coumadin x 6 months, d/c by hematology ), Reports negative hypercoagulable work at that time, Obesity but denies any major sleeping issues.  Non smoker, social use of Alcohol.  Presented  to ED with complain of syncope x 2, while walking to dental appointment, felt blurry vision, lightheadedness, fell forward and had once syncope episode. Patient got up, walked short distance to her Uber, then had another episode.   No chest pain, diaphoresis. Patient has polydipsia recently.  Tachycardic upon arrival, afebrile, sat well at RA. EKG with sinus tachy. ? septal infarct. No leukocytosis, plty 287, K 5, Cr 1.09, hsTnT 91.5. CXR with no focal consolidation. Left LE doppler negative for DVT. CT head/maxillofacial/Cspine with no acute fracture or intracranial hemorrhage. Noted indeterminate lucent lesion within the right side of the clivus and basiocciput with surrounding sclerotic change. Additional indeterminate expansile lucent lesion within the right parietal calvarium. Infection and neoplasm are within the differential diagnosis for both of these lesions. Metastatic lesions cannot be excluded.  CTA chest with bilateral PE.    SH: occasional alcohol use  FH: HTN, DM (02 Oct 2023 16:04)    PAST MEDICAL & SURGICAL HISTORY:  Diabetes    Mild HTN    No significant past surgical history    FAMILY HISTORY: Grand mom had blood clots.    SOCIAL HISTORY:  non smoker    Allergies  No Known Allergies    MEDICATIONS  (STANDING):  aspirin enteric coated 81 milliGRAM(s) Oral daily  dextrose 5%. 1000 milliLiter(s) (50 mL/Hr) IV Continuous <Continuous>  dextrose 5%. 1000 milliLiter(s) (100 mL/Hr) IV Continuous <Continuous>  dextrose 50% Injectable 12.5 Gram(s) IV Push once  dextrose 50% Injectable 25 Gram(s) IV Push once  dextrose 50% Injectable 25 Gram(s) IV Push once  enoxaparin Injectable 110 milliGRAM(s) SubCutaneous every 12 hours  glucagon  Injectable 1 milliGRAM(s) IntraMuscular once  insulin lispro (ADMELOG) corrective regimen sliding scale   SubCutaneous three times a day before meals  insulin lispro (ADMELOG) corrective regimen sliding scale   SubCutaneous at bedtime  lisinopril 40 milliGRAM(s) Oral daily  LORazepam   Injectable 2 milliGRAM(s) IV Push once  metoprolol succinate ER 50 milliGRAM(s) Oral daily  simvastatin 20 milliGRAM(s) Oral at bedtime    MEDICATIONS  (PRN):  acetaminophen     Tablet .. 650 milliGRAM(s) Oral every 6 hours PRN Mild Pain (1 - 3), Moderate Pain (4 - 6)  dextrose Oral Gel 15 Gram(s) Oral once PRN Blood Glucose LESS THAN 70 milliGRAM(s)/deciliter  melatonin 3 milliGRAM(s) Oral at bedtime PRN Insomnia    REVIEW OF SYSTEMS:    Constitutional:            No fever, weight loss or fatigue  HEENT:                      No difficulty hearing, tinnitus, vertigo; No sinus or throat pain  Respiratory:              SOB  Cardiovascular:           No chest pain, palpitations  Gastrointestinal:        No abdominal or epigastric pain. No N/V/diarrhea or hematemesis  SKIN:                             no rash  Musculoskeletal:        No joint pain or swelling  Extremities:                No swelling  Neurological:            Syncope  Psychiatric:                 No depression, anxiety    Vital Signs Last 24 Hrs  T(C): 36.9 (03 Oct 2023 11:58), Max: 37.2 (03 Oct 2023 02:50)  T(F): 98.4 (03 Oct 2023 11:58), Max: 98.9 (03 Oct 2023 02:50)  HR: 105 (03 Oct 2023 11:58) (99 - 115)  BP: 137/91 (03 Oct 2023 11:58) (110/84 - 153/81)  BP(mean): 91 (02 Oct 2023 22:34) (91 - 91)  RR: 21 (03 Oct 2023 11:58) (20 - 24)  SpO2: 96% (03 Oct 2023 11:58) (94% - 100%)    Parameters below as of 03 Oct 2023 11:58  Patient On (Oxygen Delivery Method): room air    PHYSICAL EXAM:  GEN:         Awake, responsive and comfortable.  HEENT:    Normal.    RESP:       no wheezing  CVS:         Regular rate and rhythm.   ABD:         Soft, non-tender, non-distended;   SKIN:           Warm and dry.  EXTR:            No clubbing, cyanosis or edema  CNS:              Intact sensory and motor function.  PSYCH:        cooperative, no anxiety or depression    LABS:                        11.3   8.47  )-----------( 277      ( 03 Oct 2023 09:15 )             34.7     10-03    133<L>  |  99  |  19  ----------------------------<  287<H>  4.7   |  25  |  1.28    Ca    8.8      03 Oct 2023 09:15  Phos  3.9     10-03  Mg     1.2     10-03    TPro  8.2  /  Alb  3.2<L>  /  TBili  0.3  /  DBili  x   /  AST  22  /  ALT  18  /  AlkPhos  64  10-03    PT/INR - ( 02 Oct 2023 11:15 )   PT: 11.1 sec;   INR: 0.93 ratio      PTT - ( 02 Oct 2023 11:15 )  PTT:25.0 sec    Urinalysis Basic - ( 03 Oct 2023 09:15 )    Color: x / Appearance: x / SG: x / pH: x  Gluc: 287 mg/dL / Ketone: x  / Bili: x / Urobili: x   Blood: x / Protein: x / Nitrite: x   Leuk Esterase: x / RBC: x / WBC x   Sq Epi: x / Non Sq Epi: x / Bacteria: x    EKG: Sinus tachycardia    RADIOLOGY & ADDITIONAL STUDIES:  < from: CT Angio Chest PE Protocol w/ IV Cont (10.02.23 @ 19:59) >  ACC: [id number] EXAM:  CT ANGIO CHEST PULM [facility]   ORDERED BY: ELEANOR LAZO     PROCEDURE DATE:  10/02/2023      INTERPRETATION:  INDICATION: 43 year-old female is evaluated for   pulmonary embolism    TECHNIQUE: Volumetric CT angiographic acquisition of the chest was   obtained from the thoracic inlet to the upper abdomen, after    administration of intravenous contrast using a pulmonary embolus   protocol. 3D MIP and volume-rendered images were created on a separate   workstation. Coronal and sagittal maximum intensity projection images   were performed.    This CT scan was performed with one or more of the following dose   optimization techniques: iterative reconstruction, automatic exposure   control, and/or manual adjustment of mAs and kVp according to the   patient's size.    CONTRAST: 80 cc of iohexol 350    COMPARISON: No prior studies are available for comparison.    FINDINGS:  Lines and tubes: None    Pulmonary arteries: There is a good bolus of contrast in the pulmonary   arterial system. There is opacification through the distal subsegmental   level. There is no respiratory motion artifact. There are multiple   filling defects within the bilateral lobar arteries, extending into   several segmental and subsegmentalbranches. The main pulmonary artery is   normal in size. The heart size is within normal limits. There are   prominence of right ventricle and flattening of intraventricular septum,   suggestive of mild to moderate right-sided strain..    Mediastinum: The thyroid and thoracic inlet are normal. There is no   evidence of enlarged mediastinal, hilar, or axillary lymph nodes.  No   pericardial effusion is present. The esophagus is normal.    Lung: Central tracheobronchial tree is patent. There is no focal   consolidation. There is no suspicious pulmonary nodule/mass. There is no   evidence of pleural-based groundglass opacities to suggest pulmonary   infarct/hemorrhage.    Pleura: No effusions or pneumothorax.    Abdomen: The visualized superior abdomen is normal.    Bone and soft tissue: The osseous structures and visualized soft tissues   demonstrate no acute abnormality.    IMPRESSION:  Multiple filling defects within the bilateral lobar arteries, extending   into several segmental and subsegmental branches. Mild to moderate   right-sided strain.    The clinical finding was communicated with Dr. Cortes at 9:00 PM on   10/2/2023.    RACHEL SHINE MD; Attending Radiologist  This document has been electronically signed. Oct  2 2023  9:03PM      ASSESSMENT AND PLAN:  ·	Bilateral PE.  ·	S/P Syncope.  ·	HTN.  ·	DM.  ·	CAD S/P remote  MI.  ·	LV dysfunction.  ·	Obesity.    SPO2 97% on room air.  Continue full anticoagulation with Lovenox.  Follow Echocardiogram.  BP and DM control.  For Hematology evaluation.

## 2023-10-03 NOTE — PROGRESS NOTE ADULT - SUBJECTIVE AND OBJECTIVE BOX
CHIEF COMPLAINT/INTERVAL HISTORY:    Patient is a 43y old  Female who presents with a chief complaint of syncope, abnormal CT head, elevated trop (03 Oct 2023 12:41)      HPI:  43 years old female with h/o HTN, HLD, DM, CAD, h/o DVT 2007 ( due to OC pills, treated with coumadin x 6 months, d/c by hematology, obesity present to ED with complain of syncope x 2. While walking to dental appointment, patient felt blurry vision, lightheadedness, fell forward and had once syncope episode. Patient got up, walked short distance to her Uber, then had another episode. No chest pain, diaphoresis. Patient has polydipsia recently.  Tachycardic upon arrival, afebrile, sat well at RA. EKG with sinus tachy. ? septal infarct. No leukocytosis, plty 287, K 5, Cr 1.09, hsTnT 91.5. CXR with no focal consolidation. Left LE doppler negative for DVT. CT head/maxillofacial/Cspine with no acute fracture or intracranial hemorrhage. Noted indeterminate lucent lesion within the right side of the clivus and basiocciput with surrounding sclerotic change. Additional indeterminate expansile lucent lesion within the right parietal calvarium. Infection and neoplasm are within the differential diagnosis for both of these lesions. Metastatic lesions cannot be excluded    SH: occasional alcohol use  FH: HTN, DM (02 Oct 2023 16:04)      SUBJECTIVE & OBJECTIVE: Pt seen and examined at bedside.     ICU Vital Signs Last 24 Hrs  T(C): 36.9 (03 Oct 2023 11:58), Max: 37.2 (03 Oct 2023 02:50)  T(F): 98.4 (03 Oct 2023 11:58), Max: 98.9 (03 Oct 2023 02:50)  HR: 105 (03 Oct 2023 11:58) (99 - 115)  BP: 137/91 (03 Oct 2023 11:58) (110/84 - 153/81)  BP(mean): 91 (02 Oct 2023 22:34) (91 - 91)  ABP: --  ABP(mean): --  RR: 21 (03 Oct 2023 11:58) (20 - 24)  SpO2: 96% (03 Oct 2023 11:58) (94% - 100%)    O2 Parameters below as of 03 Oct 2023 11:58  Patient On (Oxygen Delivery Method): room air              MEDICATIONS  (STANDING):  aspirin enteric coated 81 milliGRAM(s) Oral daily  dextrose 5%. 1000 milliLiter(s) (50 mL/Hr) IV Continuous <Continuous>  dextrose 5%. 1000 milliLiter(s) (100 mL/Hr) IV Continuous <Continuous>  dextrose 50% Injectable 12.5 Gram(s) IV Push once  dextrose 50% Injectable 25 Gram(s) IV Push once  dextrose 50% Injectable 25 Gram(s) IV Push once  enoxaparin Injectable 110 milliGRAM(s) SubCutaneous every 12 hours  glucagon  Injectable 1 milliGRAM(s) IntraMuscular once  insulin lispro (ADMELOG) corrective regimen sliding scale   SubCutaneous three times a day before meals  insulin lispro (ADMELOG) corrective regimen sliding scale   SubCutaneous at bedtime  lisinopril 40 milliGRAM(s) Oral daily  LORazepam   Injectable 2 milliGRAM(s) IV Push once  metoprolol succinate ER 50 milliGRAM(s) Oral daily  simvastatin 20 milliGRAM(s) Oral at bedtime    MEDICATIONS  (PRN):  acetaminophen     Tablet .. 650 milliGRAM(s) Oral every 6 hours PRN Mild Pain (1 - 3), Moderate Pain (4 - 6)  dextrose Oral Gel 15 Gram(s) Oral once PRN Blood Glucose LESS THAN 70 milliGRAM(s)/deciliter  melatonin 3 milliGRAM(s) Oral at bedtime PRN Insomnia        PHYSICAL EXAM:    GENERAL: NAD, obese, pleasant,  well-developed on O2 vai NC 3lpm  HEAD:  Atraumatic, Normocephalic  EYES: EOMI, PERRLA, conjunctiva and sclera clear  ENMT: Moist mucous membranes  NECK: Supple,   NERVOUS SYSTEM:  Alert & Oriented X3, Motor Strength 5/5 B/L upper and lower extremities;  CHEST/LUNG: Clear to auscultation bilaterally; No rales, rhonchi, wheezing, or rubs  HEART: S1, S2  ABDOMEN: Soft, Nontender,  Bowel sounds present  EXTREMITIES: no , cyanosis, or edema    LABS:                        11.3   8.47  )-----------( 277      ( 03 Oct 2023 09:15 )             34.7     10-03    133<L>  |  99  |  19  ----------------------------<  287<H>  4.7   |  25  |  1.28    Ca    8.8      03 Oct 2023 09:15  Phos  3.9     10-03  Mg     1.2     10-03    TPro  8.2  /  Alb  3.2<L>  /  TBili  0.3  /  DBili  x   /  AST  22  /  ALT  18  /  AlkPhos  64  10-03    PT/INR - ( 02 Oct 2023 11:15 )   PT: 11.1 sec;   INR: 0.93 ratio         PTT - ( 03 Oct 2023 14:25 )  PTT:31.7 sec  Urinalysis Basic - ( 03 Oct 2023 09:15 )    Color: x / Appearance: x / SG: x / pH: x  Gluc: 287 mg/dL / Ketone: x  / Bili: x / Urobili: x   Blood: x / Protein: x / Nitrite: x   Leuk Esterase: x / RBC: x / WBC x   Sq Epi: x / Non Sq Epi: x / Bacteria: x        CAPILLARY BLOOD GLUCOSE      POCT Blood Glucose.: 407 mg/dL (03 Oct 2023 11:33)  POCT Blood Glucose.: 288 mg/dL (03 Oct 2023 07:45)  POCT Blood Glucose.: 291 mg/dL (02 Oct 2023 22:36)  POCT Blood Glucose.: 276 mg/dL (02 Oct 2023 19:23)  POCT Blood Glucose.: 254 mg/dL (02 Oct 2023 18:26)

## 2023-10-03 NOTE — PATIENT PROFILE ADULT - NSPROIMPLANTSMEDDEV_GEN_A_NUR
Pt BIBEMS from home, a/ox3. pt reports "I felt weak and tired when I woke up this morning, I checked my blood pressure and it was extremely low".EMS reports "arriving to scene pt a/ox3, denied complaints, BP WNL upon arrival. pt family reports pt may have taken a dose of BP medication that was not needed". pt asymptomatic at this time, denies complaints.
None

## 2023-10-03 NOTE — CONSULT NOTE ADULT - NS ATTEND AMEND GEN_ALL_CORE FT
# PE:   patient w/ hx of provoked DVT in 2007 r/t birth control as per patient was on coumadin x6 months and then discontinued by hematologist   -Malignancy w/u as above   -On Lovenox; change to DOAC if no intervention needed   -APLS w/u   -Further hypercoag w/u can be done as out-pt   Duration of AC: long term   We will follow. Dr. Ibrahim starting service in AM

## 2023-10-03 NOTE — PROGRESS NOTE ADULT - ASSESSMENT
42 y/o F w/ PMH of HTN, DM, CAD, HLD , obesity, hx DVT in 2007 s/p 6 mth AC (had dvt due to OCP per pt)  In ED found to have b/l PE w/ RHS  On oxygen via NC 3 lpm  CT head/maxillofacial/Cspine with no acute fracture or intracranial hemorrhage. Noted indeterminate lucent lesion within the right side of the clivus and basiocciput with surrounding sclerotic change. Additional indeterminate expansile lucent lesion within the right parietal calvarium. Infection and neoplasm are within the differential diagnosis for both of these lesions. Metastatic lesions cannot be excluded

## 2023-10-04 LAB
ANION GAP SERPL CALC-SCNC: 8 MMOL/L — SIGNIFICANT CHANGE UP (ref 5–17)
B2 GLYCOPROT1 AB SER QL: NEGATIVE — SIGNIFICANT CHANGE UP
B2 MICROGLOB SERPL-MCNC: 3.6 MG/L — HIGH (ref 0.8–2.2)
BUN SERPL-MCNC: 32 MG/DL — HIGH (ref 7–23)
CALCIUM SERPL-MCNC: 8.6 MG/DL — SIGNIFICANT CHANGE UP (ref 8.5–10.1)
CARDIOLIPIN AB SER-ACNC: NEGATIVE — SIGNIFICANT CHANGE UP
CHLORIDE SERPL-SCNC: 101 MMOL/L — SIGNIFICANT CHANGE UP (ref 96–108)
CO2 SERPL-SCNC: 24 MMOL/L — SIGNIFICANT CHANGE UP (ref 22–31)
CREAT SERPL-MCNC: 1.6 MG/DL — HIGH (ref 0.5–1.3)
DRVVT RATIO: 1.05 RATIO — SIGNIFICANT CHANGE UP (ref 0–1.21)
DRVVT SCREEN TO CONFIRM RATIO: SIGNIFICANT CHANGE UP
EGFR: 41 ML/MIN/1.73M2 — LOW
GLUCOSE BLDC GLUCOMTR-MCNC: 202 MG/DL — HIGH (ref 70–99)
GLUCOSE BLDC GLUCOMTR-MCNC: 252 MG/DL — HIGH (ref 70–99)
GLUCOSE BLDC GLUCOMTR-MCNC: 263 MG/DL — HIGH (ref 70–99)
GLUCOSE BLDC GLUCOMTR-MCNC: 281 MG/DL — HIGH (ref 70–99)
GLUCOSE SERPL-MCNC: 207 MG/DL — HIGH (ref 70–99)
HCT VFR BLD CALC: 32.8 % — LOW (ref 34.5–45)
HGB BLD-MCNC: 11.3 G/DL — LOW (ref 11.5–15.5)
MCHC RBC-ENTMCNC: 30.6 PG — SIGNIFICANT CHANGE UP (ref 27–34)
MCHC RBC-ENTMCNC: 34.5 G/DL — SIGNIFICANT CHANGE UP (ref 32–36)
MCV RBC AUTO: 88.9 FL — SIGNIFICANT CHANGE UP (ref 80–100)
NORMALIZED SCT PPP-RTO: 0.91 RATIO — SIGNIFICANT CHANGE UP (ref 0–1.16)
NORMALIZED SCT PPP-RTO: SIGNIFICANT CHANGE UP
NRBC # BLD: 0 /100 WBCS — SIGNIFICANT CHANGE UP (ref 0–0)
PLATELET # BLD AUTO: 283 K/UL — SIGNIFICANT CHANGE UP (ref 150–400)
POTASSIUM SERPL-MCNC: 5.7 MMOL/L — HIGH (ref 3.5–5.3)
POTASSIUM SERPL-SCNC: 5.7 MMOL/L — HIGH (ref 3.5–5.3)
RBC # BLD: 3.69 M/UL — LOW (ref 3.8–5.2)
RBC # FLD: 12.9 % — SIGNIFICANT CHANGE UP (ref 10.3–14.5)
SODIUM SERPL-SCNC: 133 MMOL/L — LOW (ref 135–145)
WBC # BLD: 6.32 K/UL — SIGNIFICANT CHANGE UP (ref 3.8–10.5)
WBC # FLD AUTO: 6.32 K/UL — SIGNIFICANT CHANGE UP (ref 3.8–10.5)

## 2023-10-04 PROCEDURE — 74177 CT ABD & PELVIS W/CONTRAST: CPT | Mod: 26

## 2023-10-04 PROCEDURE — 99232 SBSQ HOSP IP/OBS MODERATE 35: CPT

## 2023-10-04 RX ORDER — SODIUM POLYSTYRENE SULFONATE 4.1 MEQ/G
15 POWDER, FOR SUSPENSION ORAL ONCE
Refills: 0 | Status: COMPLETED | OUTPATIENT
Start: 2023-10-04 | End: 2023-10-04

## 2023-10-04 RX ORDER — INSULIN GLARGINE 100 [IU]/ML
15 INJECTION, SOLUTION SUBCUTANEOUS AT BEDTIME
Refills: 0 | Status: DISCONTINUED | OUTPATIENT
Start: 2023-10-04 | End: 2023-10-06

## 2023-10-04 RX ORDER — SODIUM CHLORIDE 9 MG/ML
1000 INJECTION INTRAMUSCULAR; INTRAVENOUS; SUBCUTANEOUS
Refills: 0 | Status: DISCONTINUED | OUTPATIENT
Start: 2023-10-04 | End: 2023-10-05

## 2023-10-04 RX ADMIN — INSULIN GLARGINE 15 UNIT(S): 100 INJECTION, SOLUTION SUBCUTANEOUS at 21:32

## 2023-10-04 RX ADMIN — Medication 4: at 08:07

## 2023-10-04 RX ADMIN — ENOXAPARIN SODIUM 110 MILLIGRAM(S): 100 INJECTION SUBCUTANEOUS at 18:08

## 2023-10-04 RX ADMIN — Medication 3 MILLIGRAM(S): at 21:32

## 2023-10-04 RX ADMIN — Medication 6: at 17:07

## 2023-10-04 RX ADMIN — Medication 6: at 12:05

## 2023-10-04 RX ADMIN — Medication 1: at 21:33

## 2023-10-04 RX ADMIN — SIMVASTATIN 20 MILLIGRAM(S): 20 TABLET, FILM COATED ORAL at 21:32

## 2023-10-04 RX ADMIN — ENOXAPARIN SODIUM 110 MILLIGRAM(S): 100 INJECTION SUBCUTANEOUS at 06:26

## 2023-10-04 RX ADMIN — Medication 50 MILLIGRAM(S): at 06:26

## 2023-10-04 RX ADMIN — SODIUM CHLORIDE 75 MILLILITER(S): 9 INJECTION INTRAMUSCULAR; INTRAVENOUS; SUBCUTANEOUS at 16:40

## 2023-10-04 RX ADMIN — Medication 81 MILLIGRAM(S): at 11:00

## 2023-10-04 NOTE — PROGRESS NOTE ADULT - PROBLEM SELECTOR PLAN 2
elevated trops in setting of b/l PE likely sec to hypoxia/ demand ischemia- cont to monitor trops- she denies CP  on lovenox full dose for PE  check echo  trend trop  Patient is following with outpatient cardiology and had normal exercise treadmill test in 2022 per patient
elevated trops in setting of b/l PE likely sec to hypoxia/ demand ischemia- cont to monitor trops- she denies CP  on lovenox full dose for PE  echo reviewed, d/w pulm.  Trops down trending  Patient is following with outpatient cardiology and had normal exercise treadmill test in 2022 per patient

## 2023-10-04 NOTE — PROGRESS NOTE ADULT - ASSESSMENT
43 years old female with h/o HTN, HLD, DM, CAD, h/o DVT 2007 ( due to BC pills, treated with coumadin x 6 months, d/c by hematology, obesity present to ED with complain of syncope x 2.    #PE  -patient w/ hx of provoked DVT in 2007 r/t birth control as per patient was on coumadin x6 months and then discontinued by hematologist  -denies any other personal or familial hx of blood clots, denies hx of miscarriages, or still births  -US B/L LE- negative for DVT  -pending apls w/u   -patient started on LMWH-further recs dependent on findings     #Malig w/u  -patient presented s/p syncopal episode prior to stated she was in good health.  -denies any weight loss, fever or chills. Stated she still menstruates w/ normal period and is up to date w/ pap smears and mammograms although stated she was told she has dense breast and due for 6 month check up of US). Denies any previous hx of colonoscopy.  -patient does admit to familial hx of bladder cancer from her father   -patient personally assessed not obv masses or nodules noted.  -CTA-C showing PE w/ mild to mod. R heart strain, w/o any enlarged LN's  or obv pulm nodules  -CT-C spine/ maxillofacial and H showing  Indeterminate lucent lesion within the right side of the clivus and basiocciput with surrounding sclerotic change. Additional indeterminate expansile lucent lesion within the right parietal calvarium (infection vs neoplasm)  -MRI-H/ c-spine (10/3) showing Right posterior MCA distribution remote infarction. Right clavicle osseous lesion is indeterminate, differential diagnosis   including metastasis chronic osteomyelitis and primary bone tumor. Expansile lesion right parietal bone is also indeterminate, more typical   of benign primary tumor but conceivably metastasis. Shallow right sphenoid meningocele  -rec neuro eval  -[id number], FLC ratio-2.11  -pending CT A/P to r/o any occult malig  -pending chelsea/upep      Will continue to monitor the patient.  Please call with any questions [phone]  Above reviewed with Attending Dr. Ibrahim   [facility]  [address] [facility], [address], [place], [place]  [phone]  *Note not finalized until signed by Attending Physician  43 years old female with h/o HTN, HLD, DM, CAD, h/o DVT 2007 ( due to BC pills, treated with coumadin x 6 months, d/c by hematology, obesity present to ED with complain of syncope x 2.    #PE  -patient w/ hx of provoked DVT in 2007 r/t birth control as per patient was on coumadin x6 months and then discontinued by hematologist  -denies any other personal or familial hx of blood clots, denies hx of miscarriages, or still births  -US B/L LE- negative for DVT  - apls w/u so far (-)  -patient started on LMWH-further recs dependent on findings     #Malig w/u  -patient presented s/p syncopal episode prior to stated she was in good health.  -denies any weight loss, fever or chills. Stated she still menstruates w/ normal period and is up to date w/ pap smears and mammograms although stated she was told she has dense breast and due for 6 month check up of US). Denies any previous hx of colonoscopy.  -patient does admit to familial hx of bladder cancer from her father   -patient personally assessed not obv masses or nodules noted.  -CTA-C showing PE w/ mild to mod. R heart strain, w/o any enlarged LN's  or obv pulm nodules  -CT-C spine/ maxillofacial and H showing  Indeterminate lucent lesion within the right side of the clivus and basiocciput with surrounding sclerotic change. Additional indeterminate expansile lucent lesion within the right parietal calvarium (infection vs neoplasm)  -MRI-H/ c-spine (10/3) showing Right posterior MCA distribution remote infarction. Right clavicle osseous lesion is indeterminate, differential diagnosis   including metastasis chronic osteomyelitis and primary bone tumor. Expansile lesion right parietal bone is also indeterminate, more typical   of benign primary tumor but conceivably metastasis. Shallow right sphenoid meningocele  -rec neuro eval  -[id number], FLC ratio-2.11, B2M-3.6  -pending CT A/P to r/o any occult malig  -pending chelsea/upep      Will continue to monitor the patient.  Please call with any questions [phone]  Above reviewed with Attending Dr. Ibrahim   [facility]  [address] [facility], [address], [place], [place]  [phone]  *Note not finalized until signed by Attending Physician  43 years old female with h/o HTN, HLD, DM, CAD, h/o DVT 2007 ( due to BC pills, treated with coumadin x 6 months, d/c by hematology, obesity present to ED with complain of syncope x 2.    #PE  -patient w/ hx of provoked DVT in 2007 r/t birth control as per patient was on coumadin x6 months and then discontinued by hematologist  -denies any other personal or familial hx of blood clots, denies hx of miscarriages, or still births  -US B/L LE- negative for DVT  - apls w/u so far (-)  -patient started on LMWH-further recs dependent on findings     #Malig w/u  -patient presented s/p syncopal episode prior to stated she was in good health.  -denies any weight loss, fever or chills. Stated she still menstruates w/ normal period and is up to date w/ pap smears and mammograms although stated she was told she has dense breast and due for 6 month check up of US). Denies any previous hx of colonoscopy.  -patient does admit to familial hx of bladder cancer from her father   -patient personally assessed not obv masses or nodules noted.  -CTA-C showing PE w/ mild to mod. R heart strain, w/o any enlarged LN's  or obv pulm nodules  -CT-C spine/ maxillofacial and H showing  Indeterminate lucent lesion within the right side of the clivus and basiocciput with surrounding sclerotic change. Additional indeterminate expansile lucent lesion within the right parietal calvarium (infection vs neoplasm)  -MRI-H/ c-spine (10/3) showing Right posterior MCA distribution remote infarction. Right clavicle osseous lesion is indeterminate, differential diagnosis   including metastasis chronic osteomyelitis and primary bone tumor. Expansile lesion right parietal bone is also indeterminate, more typical   of benign primary tumor but conceivably metastasis. Shallow right sphenoid meningocele  -rec neuro eval  -spep-[id number]-[id number], FLC ratio-2.11, B2M-3.6  -pending CT A/P to r/o any occult malig  -pending chelsea/upep  -if patient stable for d/c Patient will need OP f/u with Heme/ONC Dr. Patrice ALLEN/[facility] [phone]- can f/u lab results at that time if not still hospitalized       Will continue to monitor the patient.  Please call with any questions [phone]  Above reviewed with Attending Dr. Patrice ALL[facility]  [address] [facility], [address], [place], [place]  [phone]  *Note not finalized until signed by Attending Physician

## 2023-10-04 NOTE — CONSULT NOTE ADULT - REASON FOR ADMISSION
syncope, abnormal CT head, elevated trop

## 2023-10-04 NOTE — PROVIDER CONTACT NOTE (OTHER) - BACKGROUND
Pt's K+ 5.7, kayexalate ordered by MD. Pt refusing medication, stating she does not like PO liquid meds or contrast.

## 2023-10-04 NOTE — PROGRESS NOTE ADULT - PROBLEM SELECTOR PLAN 3
monitor fsbs/ISS/ lantus  ISS and hypoglycemia protocol  increase  lantus dose as BG high
monitor fsbs/ISS/ lantus  Check A1c  ISS and hypoglycemia protocol  add lantus as BG very high

## 2023-10-04 NOTE — PROGRESS NOTE ADULT - PROBLEM SELECTOR PLAN 9
lovenox full dose  d/w hem/onc- will c/w FD lovenox  Pulm eval/ d/w Dr. Cruz  c/f RHS on CT- await echo- pt on 3 lpm nC, able to communicate without any issues.   trend trops
Acute b/l PE  lovenox full dose  d/w hem/onc- will c/w FD lovenox  seen by pulm  c/f RHS on CT-on 2 LNC, resp. status stable, echo reviewed, results d/w Pulm.  trops down trending  pt feels better. denies CP/ sob

## 2023-10-04 NOTE — PROGRESS NOTE ADULT - PROBLEM SELECTOR PLAN 7
Patient reported h/o nonobstructive CAD, had Cath in around [phone] at [facility] with 20% stenosis, no stent. Patient is following with outpatient cardiology and had normal exercise treadmill test in 2022 per patient  continue aspirin, statin, BB

## 2023-10-04 NOTE — PROGRESS NOTE ADULT - PROBLEM SELECTOR PLAN 4
bone lesions- c/f mets and malignancy  hem/onc eval-> recommend to check Ct a/p   pt states compliant to mammo and pap smear yearly
bone lesions- c/f mets and malignancy  hem/onc eval-> recommend to check Ct a/p to r/o occult malignancy and labs > Had CT today- reviewed  pt states compliant to mammo and pap smear yearly

## 2023-10-04 NOTE — PROGRESS NOTE ADULT - SUBJECTIVE AND OBJECTIVE BOX
INTERVAL HPI:  43 years old female with HTN, HLD, DM, CAD/MI in 2015, LV dysfunction, DVT  ( due to OC pills, treated with coumadin x 6 months, d/c by hematology ), Reports negative hypercoagulable work at that time, Obesity but denies any major sleeping issues.  Non smoker, social use of Alcohol.  Presented  to ED with complain of syncope x 2, while walking to dental appointment, felt blurry vision, lightheadedness, fell forward and had once syncope episode. Patient got up, walked short distance to her Uber, then had another episode.   No chest pain, diaphoresis. Patient has polydipsia recently.  Tachycardic upon arrival, afebrile, sat well at [facility]. EKG with sinus tachy. ? septal infarct. No leukocytosis, plty 287, K 5, Cr 1.09, hsTnT 91.5. CXR with no focal consolidation. Left LE doppler negative for DVT. CT head/maxillofacial/Cspine with no acute fracture or intracranial hemorrhage. Noted indeterminate lucent lesion within the right side of the clivus and basiocciput with surrounding sclerotic change. Additional indeterminate expansile lucent lesion within the right parietal calvarium. Infection and neoplasm are within the differential diagnosis for both of these lesions. Metastatic lesions cannot be excluded.  CTA chest with bilateral PE.    OVERNIGHT EVENTS:  Awake, responsive and comfortable.. SPO2 94% on nasal O2.    Vital Signs Last 24 Hrs  T(C): 36.8 (04 Oct 2023 16:22), Max: 37.3 (04 Oct 2023 00:07)  T(F): 98.2 (04 Oct 2023 16:22), Max: 99.1 (04 Oct 2023 00:07)  HR: 100 (04 Oct 2023 16:22) (98 - 106)  BP: 125/83 (04 Oct 2023 16:22) (94/61 - 125/83)  BP(mean): --  RR: 18 (04 Oct 2023 16:22) (18 - 18)  SpO2: 94% (04 Oct 2023 16:22) (86% - 95%)    Parameters below as of 04 Oct 2023 16:22  Patient On (Oxygen Delivery Method): nasal cannula  O2 Flow (L/min): 2    PHYSICAL EXAM:  GEN:         Awake, responsive and comfortable.  HEENT:    Normal.    RESP:       no wheezing.  CVS:          Regular rate and rhythm.     MEDICATIONS  (STANDING):  aspirin enteric coated 81 milliGRAM(s) Oral daily  dextrose 5%. 1000 milliLiter(s) (50 mL/Hr) IV Continuous <Continuous>  dextrose 5%. 1000 milliLiter(s) (100 mL/Hr) IV Continuous <Continuous>  dextrose 50% Injectable 12.5 Gram(s) IV Push once  dextrose 50% Injectable 25 Gram(s) IV Push once  dextrose 50% Injectable 25 Gram(s) IV Push once  enoxaparin Injectable 110 milliGRAM(s) SubCutaneous every 12 hours  glucagon  Injectable 1 milliGRAM(s) IntraMuscular once  influenza   Vaccine 0.5 milliLiter(s) IntraMuscular once  insulin glargine Injectable (LANTUS) 15 Unit(s) SubCutaneous at bedtime  insulin lispro (ADMELOG) corrective regimen sliding scale   SubCutaneous three times a day before meals  insulin lispro (ADMELOG) corrective regimen sliding scale   SubCutaneous at bedtime  LORazepam   Injectable 2 milliGRAM(s) IV Push once  metoprolol succinate ER 50 milliGRAM(s) Oral daily  simvastatin 20 milliGRAM(s) Oral at bedtime  sodium chloride 0.9%. 1000 milliLiter(s) (75 mL/Hr) IV Continuous <Continuous>    MEDICATIONS  (PRN):  acetaminophen     Tablet .. 650 milliGRAM(s) Oral every 6 hours PRN Mild Pain (1 - 3), Moderate Pain (4 - 6)  dextrose Oral Gel 15 Gram(s) Oral once PRN Blood Glucose LESS THAN 70 milliGRAM(s)/deciliter  melatonin 3 milliGRAM(s) Oral at bedtime PRN Insomnia    LABS:                        11.3   6.32  )-----------( 283      ( 04 Oct 2023 07:47 )             32.8     10-    133<L>  |  101  |  32<H>  ----------------------------<  207<H>  5.7<H>   |  24  |  1.60<H>    Ca    8.6      04 Oct 2023 07:47  Phos  3.9     10-03  Mg     1.2     10-    TPro  7.3  /  Alb  x   /  TBili  x   /  DBili  x   /  AST  x   /  ALT  x   /  AlkPhos  x   10-    PTT - ( 03 Oct 2023 14:25 )  PTT:31.7 sec    Urinalysis Basic - ( 04 Oct 2023 07:47 )    Color: x / Appearance: x / SG: x / pH: x  Gluc: 207 mg/dL / Ketone: x  / Bili: x / Urobili: x   Blood: x / Protein: x / Nitrite: x   Leuk Esterase: x / RBC: x / WBC x   Sq Epi: x / Non Sq Epi: x / Bacteria: x  < from: TTE Echo Complete w/o Contrast w/ Doppler (10.03.23 @ 13:21) >  ACC: [id number] EXAM:  ECHO TTE WO CON COMP W DOPP                          PROCEDURE DATE:  10/03/2023      INTERPRETATION:  TRANSTHORACIC ECHOCARDIOGRAM REPORT    Patient Name:   MAYI CAMP Patient Location: [facility] Rec #:  [id number]   Accession #:      [id number]  Account #:                    Height:           69.7 in 177.0 cm  YOB: 1980      Weight:           238.1 lb 108.00 kg  Patient Age:    43 years      BSA:              2.24 m²  Patient Gender: F BP:               137/91 mmHg    Date of Exam:        10/3/2023 1:21:13 PM  Sonographer:         AGNES  Referring Physician: SELINA RUSSO    Procedure:     2D Echo/Doppler/Color Doppler Complete.  Indications:   R94.31 - Abnormal electrocardiogram ECG/EKG  Diagnosis:     R94.31 - Abnormal electrocardiogram ECG/EKG  Study Details: Technically limited study. Study quality was adversely   affected                 due to patient obesity and body habitus.    2D AND M-MODE MEASUREMENTS (normal ranges within parentheses):  Left                 Normal   Aorta/Left            Normal  Ventricle:                    Atrium:  IVSd (2D):    1.57  (0.7-1.1) Aortic Root    2.80  (2.4-3.7)                 cm             (2D):           cm  LVPWd (2D):   1.17  (0.7-1.1) Left Atrium    3.40  (1.9-4.0)                 cm             (2D):           cm  LVIDd (2D):   3.97  (3.4-5.7) LA Vol Index   14.2                 cm             (A4C):        ml/m²  LVIDs (2D):   3.05            LA Vol Index   18.3              cm             (A2C):        ml/m²  LV FS (2D):   23.2   (>25%)   LA Vol Index   16.6                  %             (BP):         ml/m²  Relative Wall 0.59   (<0.42)  Right  Thickness                     Ventricle:          TAPSE:          0.70 cm    LV DIASTOLIC FUNCTION:  MV Peak E: 0.64 m/s Decel Time:  102 msec  MV Peak A: 0.57 m/s Septal E/e'  13.2  E/A Ratio: 1.13     Lateral E/e' 10.6  Septal e'  0.0 m/s  Lateral e' 0.1 m/s    SPECTRAL DOPPLER ANALYSIS (where applicable):  Mitral Valve:  MV P1/2 Time: 29.58 msec  MV Area, PHT: 7.44 cm²    Aortic Valve: AoV Max Van: 1.12 m/s AoV Peak P.0 mmHg AoV Mean P.3 mmHg    LVOT Vmax: 0.98 m/s LVOT VTI: 0.142 m LVOT Diameter: 2.30 cm    AoV Area, Vmax: 3.62 cm² AoV Area, VTI: 3.95 cm² AoV Area, Vmn: 3.87 cm²    Tricuspid Valve and PA/RV Systolic Pressure: TR Max Velocity: 2.41 m/s RA   Pressure: 5 mmHg RVSP/PASP: 28.2 mmHg    PHYSICIAN INTERPRETATION:  Left Ventricle: Normal left ventricular size and wall thicknesses, with   normal systolic and diastolic function.  Global LV systolic function was mildly decreased. Left ventricular   ejection fraction, by visual estimation, is 45 to 50%.  Right Ventricle: The right ventricular size is moderately enlarged. RV   wall thickness is normal. RV systolic function is normal.  Left Atrium: The left atrium is normal in size.  Right Atrium: The right atrium is normal in size.  Pericardium: There is no evidence of pericardial effusion.  Mitral Valve: Structurally normal mitral valve, with normal leaflet   excursion. No evidence of mitral valve regurgitation is seen.  Tricuspid Valve: Structurally normal tricuspid valve, with normal leaflet   excursion. Mild tricuspid regurgitation is visualized.  Aortic Valve: Normal trileaflet aortic valve with normal opening. No   evidence of aortic valve regurgitation is seen.  Pulmonic Valve: Structurally normal pulmonic valve, with normal leaflet   excursion. No indication of pulmonic valve regurgitation.  Aorta: The aortic root and ascending aorta are structurally normal, with   no evidence of dilitation.  Pulmonary Artery: The main pulmonary artery is normal in size.  Venous: The inferior vena cava was dilated, with respiratory size   variation greater than 50%.    Summary:   1. Left ventricular ejection fraction, by visual estimation, is 45 to   50%.   2. Mildly decreased global left ventricular systolic function.   3. Moderately enlarged right ventricle.   4. No evidence of mitral valve regurgitation.   5. Mild tricuspid regurgitation.      [id number] Lul Grimm MD, [facility]  Electronically signed on 10/3/2023 at 5:20:40 PM    ASSESSMENT AND PLAN:  Bilateral PE.  S/P Syncope.  HTN.  DM.  CAD S/P remote  MI.  LV dysfunction.  Obesity.    SPO2 97% on room air.  Continue full anticoagulation with Lovenox.  Follow Echocardiogram.  BP and DM control.  For Hematology evaluation.    10/04/23: Awake, responsive and comfortable.. SPO2 94% on nasal O2.  On full dose Lovenox.  Hematology following hypercoagulable work up.

## 2023-10-04 NOTE — PROGRESS NOTE ADULT - ASSESSMENT
44 y/o F w/ PMH of HTN, DM, CAD, HLD , obesity, hx DVT in 2007 s/p 6 mth AC (had dvt LLE due to OCP per pt)  In ED found to have b/l PE w/ RHS    CT head/maxillofacial/Cspine with no acute fracture or intracranial hemorrhage. Noted indeterminate lucent lesion within the right side of the clivus and basiocciput with surrounding sclerotic change. Additional indeterminate expansile lucent lesion within the right parietal calvarium. Infection and neoplasm are within the differential diagnosis for both of these lesions. Metastatic lesions cannot be excluded

## 2023-10-04 NOTE — PROGRESS NOTE ADULT - SUBJECTIVE AND OBJECTIVE BOX
Heme/Onc Progress note    INTERVAL HPI/OVERNIGHT EVENTS:  Patient S&E at bedside. No o/n events, patient stated she is very nervous but otherwise feeling better. Patient denies fever, chills, dizziness, weakness, CP, palpitations, SOB, cough, N/V/D/C, dysuria, changes in bowel movements, LE edema.    VITAL SIGNS:  T(F): 98 (10-04-23 @ 04:40)  HR: 98 (10-04-23 @ 04:40)  BP: 100/70 (10-04-23 @ 04:40)  RR: 18 (10-04-23 @ 04:40)  SpO2: 91% (10-04-23 @ 04:40)  Wt(kg): --    PHYSICAL EXAM:    Constitutional: NAD  Eyes: EOMI, sclera non-icteric  Neck: supple, no masses, no JVD  Respiratory: CTA b/l, good air entry b/l  Cardiovascular: RRR, no M/R/G  Gastrointestinal: distended soft, NT, no masses palpable, + BS,   Extremities: no c/c/e  Neurological: AAOx3      MEDICATIONS  (STANDING):  aspirin enteric coated 81 milliGRAM(s) Oral daily  dextrose 5%. 1000 milliLiter(s) (50 mL/Hr) IV Continuous <Continuous>  dextrose 5%. 1000 milliLiter(s) (100 mL/Hr) IV Continuous <Continuous>  dextrose 50% Injectable 12.5 Gram(s) IV Push once  dextrose 50% Injectable 25 Gram(s) IV Push once  dextrose 50% Injectable 25 Gram(s) IV Push once  enoxaparin Injectable 110 milliGRAM(s) SubCutaneous every 12 hours  glucagon  Injectable 1 milliGRAM(s) IntraMuscular once  influenza   Vaccine 0.5 milliLiter(s) IntraMuscular once  insulin glargine Injectable (LANTUS) 10 Unit(s) SubCutaneous at bedtime  insulin lispro (ADMELOG) corrective regimen sliding scale   SubCutaneous three times a day before meals  insulin lispro (ADMELOG) corrective regimen sliding scale   SubCutaneous at bedtime  lisinopril 40 milliGRAM(s) Oral daily  LORazepam   Injectable 2 milliGRAM(s) IV Push once  metoprolol succinate ER 50 milliGRAM(s) Oral daily  simvastatin 20 milliGRAM(s) Oral at bedtime    MEDICATIONS  (PRN):  acetaminophen     Tablet .. 650 milliGRAM(s) Oral every 6 hours PRN Mild Pain (1 - 3), Moderate Pain (4 - 6)  dextrose Oral Gel 15 Gram(s) Oral once PRN Blood Glucose LESS THAN 70 milliGRAM(s)/deciliter  melatonin 3 milliGRAM(s) Oral at bedtime PRN Insomnia      Allergies    No Known Allergies    Intolerances        LABS:                        11.3   6.32  )-----------( 283      ( 04 Oct 2023 07:47 )             32.8     10-03    133<L>  |  99  |  19  ----------------------------<  287<H>  4.7   |  25  |  1.28    Ca    8.8      03 Oct 2023 09:15  Phos  3.9     10-  Mg     1.2     10-    TPro  7.3  /  Alb  x   /  TBili  x   /  DBili  x   /  AST  x   /  ALT  x   /  AlkPhos  x   10-03    PT/INR - ( 02 Oct 2023 11:15 )   PT: 11.1 sec;   INR: 0.93 ratio         PTT - ( 03 Oct 2023 14:25 )  PTT:31.7 sec  Urinalysis Basic - ( 03 Oct 2023 09:15 )    Color: x / Appearance: x / SG: x / pH: x  Gluc: 287 mg/dL / Ketone: x  / Bili: x / Urobili: x   Blood: x / Protein: x / Nitrite: x   Leuk Esterase: x / RBC: x / WBC x   Sq Epi: x / Non Sq Epi: x / Bacteria: x      Protein Electrophoresis, Serum (10.03.23 @ 14:25)   Protein Total: 7.3 g/dL  Total Protein, Serum: 7.3 g/dLImmunoglobulins Panel (10.03.23 @ 14:25)   Quantitative Ig mg/dL  Quantitative IgA: 323 mg/dL  Quantitative IgM: 48 mg/dL  SKYLAR Kappa: 6.21 mg/dL  SKYLAR Lambda: 2.95 mg/dL  [place]rania/Lambda Free Light Chain Ratio, Serum: 2.11 Ratio      RADIOLOGY & ADDITIONAL TESTS:  Studies reviewed.    ASSESSMENT & PLAN:       < from: MR Cervical Spine w/ IV Cont (10.03.23 @ 13:30) >  ACC: [id number] EXAM:  MR SPINE CERVICAL IC   ORDERED BY: SELINA RUSSO     ACC: [id number] EXAM:  MR BRAIN IC   ORDERED BY: SELINA RUSSO     PROCEDURE DATE:  10/03/2023          INTERPRETATION:  Two examinations were performed in this patient:  1.  MR ofthe brain with and without gadolinium contrast  2.  MR cervical spine with and without gadolinium contrast      CLINICAL INFORMATION:   lesion in CT head  abnormal CT head  FMR syncope   trauma pain    TECHNIQUE:    MR brain:  Sagittal and axial T1-weighted images, axial FLAIR images,   axial susceptibility weighted images, axial T2-weighted images and axial   diffusion weighted images of the brain were obtained.  Following   gadolinium administration  axial volumetric T1 weighted images were   obtained.  This data set was reconstructed as sagittal and coronal   images. Subsequent axial T1-weighted acquisition was obtained.    MR cervical spine:   Sagittal T2-weighted, T1-weighted and STIR images   were obtained. Axial T2-weighted, T1-weighted and T2-weighted gradient   echo images were obtained.  Following gadolinium administration  sagittal   and axial fat-saturated T1-weighted images were obtained.  CONTRAST:    7.5 cc administered  ;  0 cc discarded    COMPARISON:   CT head and CT cervical preceding day    FINDINGS:    BRAIN    BRAIN:   The brain demonstrates focal parenchymal volume loss with   indistinct patchy regions of gliotic signal hyperintensity on the long TR   images within the underlying white matter. This involves muchof the   right parietal parasagittal vertex extending into the posterior frontal   lobe. Cerebral cortex within this lesion appears to be largely maintain.   The left cerebral hemisphere remains intact.   No abnormal enhancement is   found in the brain.  No diffusion restriction is found in the brain.  No   acute cerebral cortical infarct is found.   No intracranial hemorrhage is   recognized.  No mass effect is found in the brain.    CSF SPACES:   The ventricles, sulci and basal cisterns appear mild to   moderately dilated reflecting diffuse brain volume loss.    VESSELS:   The vertebral and internal carotid arteries demonstrate   expected flow voids indicating their patency. The principal dural sinuses   enhance indicating their patency.    HEAD AND NECK STRUCTURES:   The orbits are unremarkable.  Paranasal   sinuses are clear.  The nasal cavity appears intact.  The central skull   base is significant for a mixed signal intensity lesion within the right   inferior clivus. This lesion ismixed low signal intensity on the T1   weighted images, intermediate hyperintense on the long TR images. There   appears to be low-level enhancement with gadolinium administration. No   osseous expansion or epidural disease is found. There is a second small   defect of the right sphenoid bone on the most anterior inferior aspect of   the right middle cranial fossa. Shallow defect appears to contain CSF.   The nasopharynx is symmetric.  The temporal bones appear clear of   disease.  The calvarium is significant for expansile lesion within the   diploic space of the right parietal bone. Material with in the space   demonstrates fluid signal intensity with mild rim enhancement and without   apparent solid soft tissue nodularity. The overlying outer table is   expanded. The inner table remains intact. The lesion measures   approximately 3 cm parallel to the cortex x 1 cm periventricular.    CERVICAL SPINE    Cervical vertebral alignment is preserved.   Facet joints appear aligned.     Cervical vertebral body heights are maintained. Cervical vertebral   marrow signal intensity Is intact.  No pathologic vertebral enhancement   is found.  No osseous expansion or epidural disease is found..  No   destructive bone lesion is found.    Cervical intervertebral disc spaces demonstrate preserved disc heights.   Cervical intervertebral disc signal intensity and sits mild degenerative   signal intensity loss on the long TR images.  No abnormal disc space   enhancement is recognized. Mild disc deformity is most evident at C5-C6,   without significant compromise of the central thecal sac. No high-grade   central canal compromise is recognized.  Neural foramina appear intact.    Cervical cord morphology Is preserved.  The cervical cord maintains  intact signal intensity   No focal intrinsic cord lesion is identified.    No cord expansion or cord volume loss is recognized.  No abnormal   enhancement occurs within the canal.    The visualized adjacent neck structures appear intact.  No neck mass is   recognized.  Paraspinal soft tissues appear intact.  Visualized lymph   nodes may be increased in number but appear to be within physiologic size   limits.      IMPRESSION:    1. BRAIN:   Right posterior MCA distribution remote infarction. Right   clavicle osseous lesion is indeterminate, differential diagnosis   including metastasis chronic osteomyelitis and primary bone tumor.   Expansile lesion right parietal bone is also indeterminate, more typical   of benign primary tumor but conceivably metastasis. Shallow right   sphenoid meningocele    2. CERVICAL SPINE:   Unremarkable MR of the cervical spine.   No abnormal   enhancement    --- End of Report ---             Heme/Onc Progress note    INTERVAL HPI/OVERNIGHT EVENTS:  Patient S&E at bedside. No o/n events, patient stated she is very nervous but otherwise feeling better. Patient denies fever, chills, dizziness, weakness, CP, palpitations, SOB, cough, N/V/D/C, dysuria, changes in bowel movements, LE edema.    VITAL SIGNS:  T(F): 98 (10-04-23 @ 04:40)  HR: 98 (10-04-23 @ 04:40)  BP: 100/70 (10-04-23 @ 04:40)  RR: 18 (10-04-23 @ 04:40)  SpO2: 91% (10-04-23 @ 04:40)  Wt(kg): --    PHYSICAL EXAM:    Constitutional: NAD  Eyes: EOMI, sclera non-icteric  Neck: supple, no masses, no JVD  Respiratory: CTA b/l, good air entry b/l  Cardiovascular: RRR, no M/R/G  Gastrointestinal: distended soft, NT, no masses palpable, + BS,   Extremities: no c/c/e  Neurological: AAOx3      MEDICATIONS  (STANDING):  aspirin enteric coated 81 milliGRAM(s) Oral daily  dextrose 5%. 1000 milliLiter(s) (50 mL/Hr) IV Continuous <Continuous>  dextrose 5%. 1000 milliLiter(s) (100 mL/Hr) IV Continuous <Continuous>  dextrose 50% Injectable 12.5 Gram(s) IV Push once  dextrose 50% Injectable 25 Gram(s) IV Push once  dextrose 50% Injectable 25 Gram(s) IV Push once  enoxaparin Injectable 110 milliGRAM(s) SubCutaneous every 12 hours  glucagon  Injectable 1 milliGRAM(s) IntraMuscular once  influenza   Vaccine 0.5 milliLiter(s) IntraMuscular once  insulin glargine Injectable (LANTUS) 10 Unit(s) SubCutaneous at bedtime  insulin lispro (ADMELOG) corrective regimen sliding scale   SubCutaneous three times a day before meals  insulin lispro (ADMELOG) corrective regimen sliding scale   SubCutaneous at bedtime  lisinopril 40 milliGRAM(s) Oral daily  LORazepam   Injectable 2 milliGRAM(s) IV Push once  metoprolol succinate ER 50 milliGRAM(s) Oral daily  simvastatin 20 milliGRAM(s) Oral at bedtime    MEDICATIONS  (PRN):  acetaminophen     Tablet .. 650 milliGRAM(s) Oral every 6 hours PRN Mild Pain (1 - 3), Moderate Pain (4 - 6)  dextrose Oral Gel 15 Gram(s) Oral once PRN Blood Glucose LESS THAN 70 milliGRAM(s)/deciliter  melatonin 3 milliGRAM(s) Oral at bedtime PRN Insomnia      Allergies    No Known Allergies    Intolerances        LABS:                        11.3   6.32  )-----------( 283      ( 04 Oct 2023 07:47 )             32.8     10-03    133<L>  |  99  |  19  ----------------------------<  287<H>  4.7   |  25  |  1.28    Ca    8.8      03 Oct 2023 09:15  Phos  3.9     10-03  Mg     1.2     10-    TPro  7.3  /  Alb  x   /  TBili  x   /  DBili  x   /  AST  x   /  ALT  x   /  AlkPhos  x   10-03    PT/INR - ( 02 Oct 2023 11:15 )   PT: 11.1 sec;   INR: 0.93 ratio         PTT - ( 03 Oct 2023 14:25 )  PTT:31.7 sec  Urinalysis Basic - ( 03 Oct 2023 09:15 )    Color: x / Appearance: x / SG: x / pH: x  Gluc: 287 mg/dL / Ketone: x  / Bili: x / Urobili: x   Blood: x / Protein: x / Nitrite: x   Leuk Esterase: x / RBC: x / WBC x   Sq Epi: x / Non Sq Epi: x / Bacteria: x      Lupus Profile (10.03.23 @ 14:25)   Beta 2 Glycoprotein 1 Antibody Screen: Negative  Activated Partial Thromboplastin Time: 31.7: The recommended therapeutic heparin range (full dose) is 58-99 seconds.   Argatroban range is 1.5 to 3.0 times of the baseline APTT value, not to   exceed 100 seconds.   Routine coagulation results should be interpreted with caution when   taking Factor Xa inhibitors or direct thrombin inhibitors; blood sampling   prior to drug intake is recommended. sec  DRVVT Ratio: 1.05 Ratio  DRVVT Interpretation: LA NEG: Either positive Silica Clotting Time (SCT) or positive Diluted Rocael   Viper Venom Time (dRVVT) indicate the presence of Lupus Anticoagulant.   The results should be interpreted with caution when taking Factor Xa   inhibitors or direct thrombin inhibitors (DOACs). Lupus anticoagulant   testing should be performed at least 48 hours after the last dose, and   longer in patients with renal impairment  Silica Clotting Time S/C Ratio: 0.91 Ratio  Silica Clotting Time Interpretation: LA NEG: Either positive Silica Clotting Time (SCT) or positive Diluted Rocael   Viper Venom Time (dRVVT) indicate the presence of Lupus Anticoagulant.   The results should be interpreted with caution when taking heparin, LMW   heparin, Factor Xa inhibitors or direct thrombin inhibitors (DOACs).   Lupus anticoagulant testing should be performed at least 12 hours after   the last dose of heparin or LMW heparin, 48 hours after the last dose of   DOACs, and longer in patients with renal impairment.    Protein Electrophoresis, Serum (10.03.23 @ 14:25)   Protein Total: 7.3 g/dL  Total Protein, Serum: 7.3 g/dLImmunoglobulins Panel (10.03.23 @ 14:25)   Quantitative Ig mg/dL  Quantitative IgA: 323 mg/dL  Quantitative IgM: 48 mg/dL  SKYLAR Kappa: 6.21 mg/dL  SKYLAR Lambda: 2.95 mg/dL  [place]/Lambda Free Light Chain Ratio, Serum: 2.11 Ratio      RADIOLOGY & ADDITIONAL TESTS:  Studies reviewed.    ASSESSMENT & PLAN:       < from: MR Cervical Spine w/ IV Cont (10.03.23 @ 13:30) >  ACC: [id number] EXAM:  MR SPINE CERVICAL IC   ORDERED BY: SELINA RUSSO     ACC: [id number] EXAM:  MR BRAIN IC   ORDERED BY: SELINA RUSSO     PROCEDURE DATE:  10/03/2023          INTERPRETATION:  Two examinations were performed in this patient:  1.  MR ofthe brain with and without gadolinium contrast  2.  MR cervical spine with and without gadolinium contrast      CLINICAL INFORMATION:   lesion in CT head  abnormal CT head  FMR syncope   trauma pain    TECHNIQUE:    MR brain:  Sagittal and axial T1-weighted images, axial FLAIR images,   axial susceptibility weighted images, axial T2-weighted images and axial   diffusion weighted images of the brain were obtained.  Following   gadolinium administration  axial volumetric T1 weighted images were   obtained.  This data set was reconstructed as sagittal and coronal   images. Subsequent axial T1-weighted acquisition was obtained.    MR cervical spine:   Sagittal T2-weighted, T1-weighted and STIR images   were obtained. Axial T2-weighted, T1-weighted and T2-weighted gradient   echo images were obtained.  Following gadolinium administration  sagittal   and axial fat-saturated T1-weighted images were obtained.  CONTRAST:    7.5 cc administered  ;  0 cc discarded    COMPARISON:   CT head and CT cervical preceding day    FINDINGS:    BRAIN    BRAIN:   The brain demonstrates focal parenchymal volume loss with   indistinct patchy regions of gliotic signal hyperintensity on the long TR   images within the underlying white matter. This involves muchof the   right parietal parasagittal vertex extending into the posterior frontal   lobe. Cerebral cortex within this lesion appears to be largely maintain.   The left cerebral hemisphere remains intact.   No abnormal enhancement is   found in the brain.  No diffusion restriction is found in the brain.  No   acute cerebral cortical infarct is found.   No intracranial hemorrhage is   recognized.  No mass effect is found in the brain.    CSF SPACES:   The ventricles, sulci and basal cisterns appear mild to   moderately dilated reflecting diffuse brain volume loss.    VESSELS:   The vertebral and internal carotid arteries demonstrate   expected flow voids indicating their patency. The principal dural sinuses   enhance indicating their patency.    HEAD AND NECK STRUCTURES:   The orbits are unremarkable.  Paranasal   sinuses are clear.  The nasal cavity appears intact.  The central skull   base is significant for a mixed signal intensity lesion within the right   inferior clivus. This lesion ismixed low signal intensity on the T1   weighted images, intermediate hyperintense on the long TR images. There   appears to be low-level enhancement with gadolinium administration. No   osseous expansion or epidural disease is found. There is a second small   defect of the right sphenoid bone on the most anterior inferior aspect of   the right middle cranial fossa. Shallow defect appears to contain CSF.   The nasopharynx is symmetric.  The temporal bones appear clear of   disease.  The calvarium is significant for expansile lesion within the   diploic space of the right parietal bone. Material with in the space   demonstrates fluid signal intensity with mild rim enhancement and without   apparent solid soft tissue nodularity. The overlying outer table is   expanded. The inner table remains intact. The lesion measures   approximately 3 cm parallel to the cortex x 1 cm periventricular.    CERVICAL SPINE    Cervical vertebral alignment is preserved.   Facet joints appear aligned.     Cervical vertebral body heights are maintained. Cervical vertebral   marrow signal intensity Is intact.  No pathologic vertebral enhancement   is found.  No osseous expansion or epidural disease is found..  No   destructive bone lesion is found.    Cervical intervertebral disc spaces demonstrate preserved disc heights.   Cervical intervertebral disc signal intensity and sits mild degenerative   signal intensity loss on the long TR images.  No abnormal disc space   enhancement is recognized. Mild disc deformity is most evident at C5-C6,   without significant compromise of the central thecal sac. No high-grade   central canal compromise is recognized.  Neural foramina appear intact.    Cervical cord morphology Is preserved.  The cervical cord maintains  intact signal intensity   No focal intrinsic cord lesion is identified.    No cord expansion or cord volume loss is recognized.  No abnormal   enhancement occurs within the canal.    The visualized adjacent neck structures appear intact.  No neck mass is   recognized.  Paraspinal soft tissues appear intact.  Visualized lymph   nodes may be increased in number but appear to be within physiologic size   limits.      IMPRESSION:    1. BRAIN:   Right posterior MCA distribution remote infarction. Right   clavicle osseous lesion is indeterminate, differential diagnosis   including metastasis chronic osteomyelitis and primary bone tumor.   Expansile lesion right parietal bone is also indeterminate, more typical   of benign primary tumor but conceivably metastasis. Shallow right   sphenoid meningocele    2. CERVICAL SPINE:   Unremarkable MR of the cervical spine.   No abnormal   enhancement    --- End of Report ---

## 2023-10-04 NOTE — PROGRESS NOTE ADULT - PROBLEM SELECTOR PLAN 5
BP low- w/ marybeth and hyperkalemia today  HOLD lisinopril   kayexalate  IVf x 1 day  nephro consulted ?MEENU
c/w anti HTNves

## 2023-10-04 NOTE — PROGRESS NOTE ADULT - SUBJECTIVE AND OBJECTIVE BOX
CHIEF COMPLAINT/INTERVAL HISTORY:    Patient is a 43y old  Female who presents with a chief complaint of syncope, abnormal CT head, elevated trop (04 Oct 2023 09:08)      HPI:  43 years old female with h/o HTN, HLD, DM, CAD, h/o DVT 2007 ( due to OC pills, treated with coumadin x 6 months, d/c by hematology, obesity present to ED with complain of syncope x 2. While walking to dental appointment, patient felt blurry vision, lightheadedness, fell forward and had once syncope episode. Patient got up, walked short distance to her [employer], then had another episode. No chest pain, diaphoresis. Patient has polydipsia recently.  Tachycardic upon arrival, afebrile, sat well at RA. EKG with sinus tachy. ? septal infarct. No leukocytosis, plty 287, K 5, Cr 1.09, hsTnT 91.5. CXR with no focal consolidation. Left LE doppler negative for DVT. CT head/maxillofacial/Cspine with no acute fracture or intracranial hemorrhage. Noted indeterminate lucent lesion within the right side of the clivus and basiocciput with surrounding sclerotic change. Additional indeterminate expansile lucent lesion within the right parietal calvarium. Infection and neoplasm are within the differential diagnosis for both of these lesions. Metastatic lesions cannot be excluded    SH: occasional alcohol use  FH: HTN, DM (02 Oct 2023 16:04)      SUBJECTIVE & OBJECTIVE: Pt seen and examined at bedside.     ICU Vital Signs Last 24 Hrs  T(C): 37.1 (04 Oct 2023 10:51), Max: 37.3 (04 Oct 2023 00:07)  T(F): 98.8 (04 Oct 2023 10:51), Max: 99.1 (04 Oct 2023 00:07)  HR: 106 (04 Oct 2023 10:51) (98 - 106)  BP: 94/61 (04 Oct 2023 10:51) (92/68 - 101/70)  BP(mean): 85 (03 Oct 2023 18:19) (85 - 85)  ABP: --  ABP(mean): --  RR: 18 (04 Oct 2023 12:15) (18 - 24)  SpO2: 95% (04 Oct 2023 12:15) (86% - 95%)    O2 Parameters below as of 04 Oct 2023 12:15  Patient On (Oxygen Delivery Method): nasal cannula  O2 Flow (L/min): 2            MEDICATIONS  (STANDING):  aspirin enteric coated 81 milliGRAM(s) Oral daily  dextrose 5%. 1000 milliLiter(s) (50 mL/Hr) IV Continuous <Continuous>  dextrose 5%. 1000 milliLiter(s) (100 mL/Hr) IV Continuous <Continuous>  dextrose 50% Injectable 12.5 Gram(s) IV Push once  dextrose 50% Injectable 25 Gram(s) IV Push once  dextrose 50% Injectable 25 Gram(s) IV Push once  enoxaparin Injectable 110 milliGRAM(s) SubCutaneous every 12 hours  glucagon  Injectable 1 milliGRAM(s) IntraMuscular once  influenza   Vaccine 0.5 milliLiter(s) IntraMuscular once  insulin glargine Injectable (LANTUS) 10 Unit(s) SubCutaneous at bedtime  insulin lispro (ADMELOG) corrective regimen sliding scale   SubCutaneous three times a day before meals  insulin lispro (ADMELOG) corrective regimen sliding scale   SubCutaneous at bedtime  lisinopril 40 milliGRAM(s) Oral daily  LORazepam   Injectable 2 milliGRAM(s) IV Push once  metoprolol succinate ER 50 milliGRAM(s) Oral daily  simvastatin 20 milliGRAM(s) Oral at bedtime  sodium chloride 0.9%. 1000 milliLiter(s) (75 mL/Hr) IV Continuous <Continuous>  sodium polystyrene sulfonate Suspension 15 Gram(s) Oral once    MEDICATIONS  (PRN):  acetaminophen     Tablet .. 650 milliGRAM(s) Oral every 6 hours PRN Mild Pain (1 - 3), Moderate Pain (4 - 6)  dextrose Oral Gel 15 Gram(s) Oral once PRN Blood Glucose LESS THAN 70 milliGRAM(s)/deciliter  melatonin 3 milliGRAM(s) Oral at bedtime PRN Insomnia        PHYSICAL EXAM:    GENERAL: NAD, well-developed  HEAD:  Atraumatic, Normocephalic  EYES: EOMI, PERRLA, conjunctiva and sclera clear  ENMT: Moist mucous membranes  NECK: Supple,  NERVOUS SYSTEM:  Alert & Oriented X3, non focal  CHEST/LUNG: Clear to auscultation bilaterally; No rales, rhonchi, wheezing, or rubs  HEART: Regular rate and rhythm  ABDOMEN: Soft, Nontender, Bowel sounds present  EXTREMITIES:  no cyanosis, chronic LLE edema since prior dvt     LABS:                        11.3   6.32  )-----------( 283      ( 04 Oct 2023 07:47 )             32.8     10-04    133<L>  |  101  |  32<H>  ----------------------------<  207<H>  5.7<H>   |  24  |  1.60<H>    Ca    8.6      04 Oct 2023 07:47  Phos  3.9     10-03  Mg     1.2     10-03    TPro  7.3  /  Alb  x   /  TBili  x   /  DBili  x   /  AST  x   /  ALT  x   /  AlkPhos  x   10-03    PTT - ( 03 Oct 2023 14:25 )  PTT:31.7 sec  Urinalysis Basic - ( 04 Oct 2023 07:47 )    Color: x / Appearance: x / SG: x / pH: x  Gluc: 207 mg/dL / Ketone: x  / Bili: x / Urobili: x   Blood: x / Protein: x / Nitrite: x   Leuk Esterase: x / RBC: x / WBC x   Sq Epi: x / Non Sq Epi: x / Bacteria: x        CAPILLARY BLOOD GLUCOSE      POCT Blood Glucose.: 281 mg/dL (04 Oct 2023 11:16)  POCT Blood Glucose.: 202 mg/dL (04 Oct 2023 07:48)  POCT Blood Glucose.: 255 mg/dL (03 Oct 2023 22:18)  POCT Blood Glucose.: 252 mg/dL (03 Oct 2023 21:13)  POCT Blood Glucose.: 255 mg/dL (03 Oct 2023 16:45)

## 2023-10-04 NOTE — PROGRESS NOTE ADULT - PROBLEM SELECTOR PLAN 8
h/o left LE DVT 2007 due to OCP. Patient was following with hematologist. Completed 6 months of coumadin and was stopped by hematology
h/o left LE DVT 2007 due to OCP. Patient was following with hematologist. Completed 6 months of coumadin and was stopped by hematology

## 2023-10-04 NOTE — CONSULT NOTE ADULT - SUBJECTIVE AND OBJECTIVE BOX
[facility] NEPHROLOGY SERVICES, [facility]  NEPHROLOGY AND HYPERTENSION  [address]  [address]  [place] [address]  [phone]    MD AZEEM BAH MD YELENA ROSENBERG, MD BINNY KOSHY, MD CHRISTOPHER CAPUTO, MD EDWARD BOVER, MD      Information from chart:  "Patient is a 43y old  Female who presents with a chief complaint of syncope, abnormal CT head, elevated trop (04 Oct 2023 20:27)    HPI:  43 years old female with h/o HTN, HLD, DM, CAD, h/o DVT  ( due to OC pills, treated with coumadin x 6 months, d/c by hematology, obesity present to ED with complain of syncope x 2. While walking to dental appointment, patient felt blurry vision, lightheadedness, fell forward and had once syncope episode. Patient got up, walked short distance to her Uber, then had another episode. No chest pain, diaphoresis. Patient has polydipsia recently.  Tachycardic upon arrival, afebrile, sat well at RA. EKG with sinus tachy. ? septal infarct. No leukocytosis, plty 287, K 5, Cr 1.09, hsTnT 91.5. CXR with no focal consolidation. Left LE doppler negative for DVT. CT head/maxillofacial/Cspine with no acute fracture or intracranial hemorrhage. Noted indeterminate lucent lesion within the right side of the clivus and basiocciput with surrounding sclerotic change. Additional indeterminate expansile lucent lesion within the right parietal calvarium. Infection and neoplasm are within the differential diagnosis for both of these lesions. Metastatic lesions cannot be excluded    SH: occasional alcohol use  FH: HTN, DM (02 Oct 2023 16:04)   "  Noted increasing Cr trend post IV contrast  Hx DM with retinopathy        PAST MEDICAL & SURGICAL HISTORY:  Diabetes      Mild HTN      No significant past surgical history        FAMILY HISTORY:    Allergies    No Known Allergies    Intolerances      Home Medications:  aspirin 81 mg oral delayed release tablet: 1 tab(s) orally once a day (02 Oct 2023 15:55)  lisinopril 40 mg oral tablet: 1 tab(s) orally once a day (02 Oct 2023 15:55)  metFORMIN 1000 mg oral tablet: 1 tab(s) orally 2 times a day (02 Oct 2023 15:55)  metoprolol succinate 50 mg oral tablet, extended release: 1 tab(s) orally once a day (02 Oct 2023 15:55)  simvastatin 20 mg oral tablet: 1 tab(s) orally once a day (at bedtime) (02 Oct 2023 15:55)    MEDICATIONS  (STANDING):  aspirin enteric coated 81 milliGRAM(s) Oral daily  dextrose 5%. 1000 milliLiter(s) (50 mL/Hr) IV Continuous <Continuous>  dextrose 5%. 1000 milliLiter(s) (100 mL/Hr) IV Continuous <Continuous>  dextrose 50% Injectable 12.5 Gram(s) IV Push once  dextrose 50% Injectable 25 Gram(s) IV Push once  dextrose 50% Injectable 25 Gram(s) IV Push once  enoxaparin Injectable 110 milliGRAM(s) SubCutaneous every 12 hours  glucagon  Injectable 1 milliGRAM(s) IntraMuscular once  influenza   Vaccine 0.5 milliLiter(s) IntraMuscular once  insulin glargine Injectable (LANTUS) 15 Unit(s) SubCutaneous at bedtime  insulin lispro (ADMELOG) corrective regimen sliding scale   SubCutaneous three times a day before meals  insulin lispro (ADMELOG) corrective regimen sliding scale   SubCutaneous at bedtime  LORazepam   Injectable 2 milliGRAM(s) IV Push once  metoprolol succinate ER 50 milliGRAM(s) Oral daily  simvastatin 20 milliGRAM(s) Oral at bedtime  sodium chloride 0.9%. 1000 milliLiter(s) (75 mL/Hr) IV Continuous <Continuous>    MEDICATIONS  (PRN):  acetaminophen     Tablet .. 650 milliGRAM(s) Oral every 6 hours PRN Mild Pain (1 - 3), Moderate Pain (4 - 6)  dextrose Oral Gel 15 Gram(s) Oral once PRN Blood Glucose LESS THAN 70 milliGRAM(s)/deciliter  melatonin 3 milliGRAM(s) Oral at bedtime PRN Insomnia    Vital Signs Last 24 Hrs  T(C): 36.8 (04 Oct 2023 16:22), Max: 37.3 (04 Oct 2023 00:07)  T(F): 98.2 (04 Oct 2023 16:22), Max: 99.1 (04 Oct 2023 00:07)  HR: 100 (04 Oct 2023 16:22) (98 - 106)  BP: 125/83 (04 Oct 2023 16:22) (94/61 - 125/83)  BP(mean): --  RR: 18 (04 Oct 2023 16:22) (18 - 18)  SpO2: 94% (04 Oct 2023 16:22) (86% - 95%)    Parameters below as of 04 Oct 2023 16:22  Patient On (Oxygen Delivery Method): nasal cannula  O2 Flow (L/min): 2      Daily     Daily Weight in k.5 (04 Oct 2023 04:40)    10-03-23 @ 07:01  -  10-04-23 @ 07:00  --------------------------------------------------------  IN: 0 mL / OUT: 400 mL / NET: -400 mL    10-04-23 @ 07:01  -  10-04-23 @ 23:03  --------------------------------------------------------  IN: 670 mL / OUT: 0 mL / NET: 670 mL      CAPILLARY BLOOD GLUCOSE      POCT Blood Glucose.: 263 mg/dL (04 Oct 2023 21:20)  POCT Blood Glucose.: 252 mg/dL (04 Oct 2023 16:38)  POCT Blood Glucose.: 281 mg/dL (04 Oct 2023 11:16)  POCT Blood Glucose.: 202 mg/dL (04 Oct 2023 07:48)    PHYSICAL EXAM:      T(C): 36.8 (10-04-23 @ 16:22), Max: 37.3 (10-04-23 @ 00:07)  HR: 100 (10-04-23 @ 16:22) (98 - 106)  BP: 125/83 (10-04-23 @ 16:22) (94/61 - 125/83)  RR: 18 (10-04-23 @ 16:22) (18 - 18)  SpO2: 94% (10-04-23 @ 16:22) (86% - 95%)  Wt(kg): --  Lungs clear  Heart S1S2  Abd soft NT ND  Extremities:   tr edema              10-04    133<L>  |  101  |  32<H>  ----------------------------<  207<H>  5.7<H>   |  24  |  1.60<H>    Ca    8.6      04 Oct 2023 07:47  Phos  3.9     10-03  Mg     1.2     10-03    TPro  7.3  /  Alb  x   /  TBili  x   /  DBili  x   /  AST  x   /  ALT  x   /  AlkPhos  x   10-03                          11.3   6.32  )-----------( 283      ( 04 Oct 2023 07:47 )             32.8     Creatinine Trend: 1.60<--, 1.28<--, 1.09<--  Urinalysis Basic - ( 04 Oct 2023 07:47 )    Color: x / Appearance: x / SG: x / pH: x  Gluc: 207 mg/dL / Ketone: x  / Bili: x / Urobili: x   Blood: x / Protein: x / Nitrite: x   Leuk Esterase: x / RBC: x / WBC x   Sq Epi: x / Non Sq Epi: x / Bacteria: x      Trend Bun/Cr  10-04-23 @ 07:47  BUN/CR -  32<H> / 1.60<H>  10-03-23 @ 09:15  BUN/CR -  19 / 1.28  10-02-23 @ 11:15  BUN/CR -  13 / 1.09        Assessment   NAEL pre renal azotemia; contrast nephropathy  Related hyperkalemia   Pulmonary embolism     Plan  Hold ACE;   IVF challenge;   Lokelma   Will follow     Wenceslao Chavarria MD

## 2023-10-05 LAB
% ALBUMIN: 48 % — SIGNIFICANT CHANGE UP
% ALPHA 1: 4.6 % — SIGNIFICANT CHANGE UP
% ALPHA 2: 12.5 % — SIGNIFICANT CHANGE UP
% BETA: 13.6 % — SIGNIFICANT CHANGE UP
% GAMMA: 21.3 % — SIGNIFICANT CHANGE UP
ALBUMIN SERPL ELPH-MCNC: 3.5 G/DL — LOW (ref 3.6–5.5)
ALBUMIN/GLOB SERPL ELPH: 0.9 RATIO — SIGNIFICANT CHANGE UP
ALPHA1 GLOB SERPL ELPH-MCNC: 0.3 G/DL — SIGNIFICANT CHANGE UP (ref 0.1–0.4)
ALPHA2 GLOB SERPL ELPH-MCNC: 0.9 G/DL — SIGNIFICANT CHANGE UP (ref 0.5–1)
ANION GAP SERPL CALC-SCNC: 5 MMOL/L — SIGNIFICANT CHANGE UP (ref 5–17)
B-GLOBULIN SERPL ELPH-MCNC: 1 G/DL — SIGNIFICANT CHANGE UP (ref 0.5–1)
BUN SERPL-MCNC: 25 MG/DL — HIGH (ref 7–23)
CALCIUM SERPL-MCNC: 8.4 MG/DL — LOW (ref 8.5–10.1)
CHLORIDE SERPL-SCNC: 104 MMOL/L — SIGNIFICANT CHANGE UP (ref 96–108)
CO2 SERPL-SCNC: 25 MMOL/L — SIGNIFICANT CHANGE UP (ref 22–31)
CREAT SERPL-MCNC: 1.22 MG/DL — SIGNIFICANT CHANGE UP (ref 0.5–1.3)
EGFR: 56 ML/MIN/1.73M2 — LOW
FERRITIN SERPL-MCNC: 70 NG/ML — SIGNIFICANT CHANGE UP (ref 15–150)
GAMMA GLOBULIN: 1.6 G/DL — SIGNIFICANT CHANGE UP (ref 0.6–1.6)
GLUCOSE BLDC GLUCOMTR-MCNC: 184 MG/DL — HIGH (ref 70–99)
GLUCOSE BLDC GLUCOMTR-MCNC: 206 MG/DL — HIGH (ref 70–99)
GLUCOSE BLDC GLUCOMTR-MCNC: 218 MG/DL — HIGH (ref 70–99)
GLUCOSE BLDC GLUCOMTR-MCNC: 254 MG/DL — HIGH (ref 70–99)
GLUCOSE SERPL-MCNC: 194 MG/DL — HIGH (ref 70–99)
HAPTOGLOB SERPL-MCNC: 254 MG/DL — HIGH (ref 34–200)
HCT VFR BLD CALC: 30.4 % — LOW (ref 34.5–45)
HGB BLD-MCNC: 9.9 G/DL — LOW (ref 11.5–15.5)
INTERPRETATION SERPL IFE-IMP: SIGNIFICANT CHANGE UP
IRON SATN MFR SERPL: 26 UG/DL — LOW (ref 30–160)
IRON SATN MFR SERPL: 9 % — LOW (ref 14–50)
LDH SERPL L TO P-CCNC: 161 U/L — SIGNIFICANT CHANGE UP (ref 50–242)
MCHC RBC-ENTMCNC: 29.4 PG — SIGNIFICANT CHANGE UP (ref 27–34)
MCHC RBC-ENTMCNC: 32.6 G/DL — SIGNIFICANT CHANGE UP (ref 32–36)
MCV RBC AUTO: 90.2 FL — SIGNIFICANT CHANGE UP (ref 80–100)
NRBC # BLD: 0 /100 WBCS — SIGNIFICANT CHANGE UP (ref 0–0)
PLATELET # BLD AUTO: 290 K/UL — SIGNIFICANT CHANGE UP (ref 150–400)
POTASSIUM SERPL-MCNC: 4.3 MMOL/L — SIGNIFICANT CHANGE UP (ref 3.5–5.3)
POTASSIUM SERPL-SCNC: 4.3 MMOL/L — SIGNIFICANT CHANGE UP (ref 3.5–5.3)
PROT ?TM UR-MCNC: 12 MG/DL — SIGNIFICANT CHANGE UP (ref 0–12)
PROT PATTERN SERPL ELPH-IMP: SIGNIFICANT CHANGE UP
RBC # BLD: 3.37 M/UL — LOW (ref 3.8–5.2)
RBC # FLD: 13.1 % — SIGNIFICANT CHANGE UP (ref 10.3–14.5)
RETICS #: 67.1 K/UL — SIGNIFICANT CHANGE UP (ref 25–125)
RETICS/RBC NFR: 2 % — SIGNIFICANT CHANGE UP (ref 0.5–2.5)
SODIUM SERPL-SCNC: 134 MMOL/L — LOW (ref 135–145)
TIBC SERPL-MCNC: 284 UG/DL — SIGNIFICANT CHANGE UP (ref 220–430)
UIBC SERPL-MCNC: 258 UG/DL — SIGNIFICANT CHANGE UP (ref 110–370)
WBC # BLD: 6.33 K/UL — SIGNIFICANT CHANGE UP (ref 3.8–10.5)
WBC # FLD AUTO: 6.33 K/UL — SIGNIFICANT CHANGE UP (ref 3.8–10.5)

## 2023-10-05 PROCEDURE — 99232 SBSQ HOSP IP/OBS MODERATE 35: CPT

## 2023-10-05 RX ORDER — APIXABAN 2.5 MG/1
10 TABLET, FILM COATED ORAL EVERY 12 HOURS
Refills: 0 | Status: DISCONTINUED | OUTPATIENT
Start: 2023-10-05 | End: 2023-10-06

## 2023-10-05 RX ORDER — APIXABAN 2.5 MG/1
10 TABLET, FILM COATED ORAL EVERY 12 HOURS
Refills: 0 | Status: DISCONTINUED | OUTPATIENT
Start: 2023-10-05 | End: 2023-10-05

## 2023-10-05 RX ADMIN — SIMVASTATIN 20 MILLIGRAM(S): 20 TABLET, FILM COATED ORAL at 21:58

## 2023-10-05 RX ADMIN — Medication 81 MILLIGRAM(S): at 11:23

## 2023-10-05 RX ADMIN — INSULIN GLARGINE 15 UNIT(S): 100 INJECTION, SOLUTION SUBCUTANEOUS at 22:00

## 2023-10-05 RX ADMIN — APIXABAN 10 MILLIGRAM(S): 2.5 TABLET, FILM COATED ORAL at 17:47

## 2023-10-05 RX ADMIN — Medication 3 MILLIGRAM(S): at 23:42

## 2023-10-05 RX ADMIN — Medication 4: at 08:56

## 2023-10-05 RX ADMIN — Medication 6: at 17:41

## 2023-10-05 RX ADMIN — Medication 4: at 11:23

## 2023-10-05 RX ADMIN — ENOXAPARIN SODIUM 110 MILLIGRAM(S): 100 INJECTION SUBCUTANEOUS at 06:05

## 2023-10-05 NOTE — DIETITIAN INITIAL EVALUATION ADULT - PERTINENT LABORATORY DATA
10-05    134<L>  |  104  |  25<H>  ----------------------------<  194<H>  4.3   |  25  |  1.22    Ca    8.4<L>      05 Oct 2023 08:03    TPro  7.3  /  Alb  3.5<L>  /  TBili  x   /  DBili  x   /  AST  x   /  ALT  x   /  AlkPhos  x   10-03  POCT Blood Glucose.: 218 mg/dL (10-05-23 @ 10:58)  A1C with Estimated Average Glucose Result: 12.3 % <H>(10-03-23 @ 09:15)  A1C with Estimated Average Glucose Result: 12.3 %<H> (10-02-23 @ 15:56)

## 2023-10-05 NOTE — DIETITIAN INITIAL EVALUATION ADULT - ORAL INTAKE PTA/DIET HISTORY
Pt reports good appetite in general, did her own shopping/cooking.  Diet PTA: CHO controlled for diabetes.  Pt without report of altered chew/swallow.  Food intolerance to milk/lactose intolerance reported, pt was using Lactaid milk @ home.

## 2023-10-05 NOTE — DIETITIAN INITIAL EVALUATION ADULT - PROBLEM SELECTOR PLAN 3
glucose elevated at 359 in BMP  Patient is not checking FS, only on metformin 1g bid at home  Check A1c  ISS and hypoglycemia protocol  If persistently elevated glucose or A1c is significantly elevated, will then add basal and bolus insulin

## 2023-10-05 NOTE — DIETITIAN INITIAL EVALUATION ADULT - ADD RECOMMEND
Endocrine consult as indicated  complains of pain/discomfort Endocrine consult as indicated   Insulin teaching if d/c plan is for insulin

## 2023-10-05 NOTE — PROGRESS NOTE ADULT - ASSESSMENT
43 years old female with h/o HTN, HLD, DM, CAD, h/o DVT 2007 ( due to BC pills, treated with coumadin x 6 months, d/c by hematology, obesity present to ED with complain of syncope x 2.    #PE  -patient w/ hx of provoked DVT in 2007 r/t birth control as per patient was on coumadin x6 months and then discontinued by hematologist  -denies any other personal or familial hx of blood clots, denies hx of miscarriages, or still births  -US B/L LE- negative for DVT  - apls w/u (-)  -patient started on LMWH-can transition to DOAC when medically appropriate     #Malig w/u  -patient presented s/p syncopal episode prior to stated she was in good health.  -denies any weight loss, fever or chills. Stated she still menstruates w/ normal period and is up to date w/ pap smears and mammograms although stated she was told she has dense breast and due for 6 month check up of US). Denies any previous hx of colonoscopy.  -patient does admit to familial hx of bladder cancer from her father   -patient personally assessed not obv masses or nodules noted.  -CTA-C showing PE w/ mild to mod. R heart strain, w/o any enlarged LN's  or obv pulm nodules  -CT-C spine/ maxillofacial and H showing  Indeterminate lucent lesion within the right side of the clivus and basiocciput with surrounding sclerotic change. Additional indeterminate expansile lucent lesion within the right parietal calvarium (infection vs neoplasm)  -MRI-H/ c-spine (10/3) showing Right posterior MCA distribution remote infarction. Right clavicle osseous lesion is indeterminate, differential diagnosis   including metastasis chronic osteomyelitis and primary bone tumor. Expansile lesion right parietal bone is also indeterminate, more typical   of benign primary tumor but conceivably metastasis. Shallow right sphenoid meningocele  -rec neuro eval  -CT A/PHepatic steatosis, Possible gallstones or sludge. Correlate with MRI, IUD in the uterus. 1.8 cm right ovarian follicle, and Pedunculated fundal myoma.  -spep- NL Electrophoresis pattern,  without M-spike, IgG-1737, FLC ratio-2.11, B2M-3.6  -pending upep  -if patient stable for d/c Patient will need OP f/u with Heme/ONC Dr[facility] Pat[phone]- can f/u lab results at that time if not still hospitalized       Will continue to monitor the patient.  Please call with any questions [phone]  Above reviewed with Attending Dr. Patrice ALLEN/[facility]  [address], [address], [place], [place]  [phone]  *Note not finalized until signed by Attending Physician  43 years old female with h/o HTN, HLD, DM, CAD, h/o DVT 2007 ( due to BC pills, treated with coumadin x 6 months, d/c by hematology, obesity present to ED with complain of syncope x 2.    #PE  -patient w/ hx of provoked DVT in 2007 r/t birth control as per patient was on coumadin x6 months and then discontinued by hematologist  -denies any other personal or familial hx of blood clots, denies hx of miscarriages, or still births  -US B/L LE- negative for DVT  - apls w/u (-)  -patient started on LMWH-can transition to DOAC when medically appropriate     #Malig w/u  -patient presented s/p syncopal episode prior to stated she was in good health.  -denies any weight loss, fever or chills. Stated she still menstruates w/ normal period and is up to date w/ pap smears and mammograms although stated she was told she has dense breast and due for 6 month check up of US). Denies any previous hx of colonoscopy.  -patient does admit to familial hx of bladder cancer from her father   -patient personally assessed not obv masses or nodules noted.  -CTA-C showing PE w/ mild to mod. R heart strain, w/o any enlarged LN's  or obv pulm nodules  -CT-C spine/ maxillofacial and H showing  Indeterminate lucent lesion within the right side of the clivus and basiocciput with surrounding sclerotic change. Additional indeterminate expansile lucent lesion within the right parietal calvarium (infection vs neoplasm)  -MRI-H/ c-spine (10/3) showing Right posterior MCA distribution remote infarction. Right clavicle osseous lesion is indeterminate, differential diagnosis   including metastasis chronic osteomyelitis and primary bone tumor. Expansile lesion right parietal bone is also indeterminate, more typical   of benign primary tumor but conceivably metastasis. Shallow right sphenoid meningocele  -rec neuro eval  -CT A/PHepatic steatosis, Possible gallstones or sludge. Correlate with MRI, IUD in the uterus. 1.8 cm right ovarian follicle, and Pedunculated fundal myoma.  -spep- NL Electrophoresis pattern,  without M-spike, IgG-1737, FLC ratio-2.11, B2M-3.6  -pending upep  -if patient stable for d/c Patient will need OP f/u with Heme/ONC Dr. Patrice ALLEN/[facility] [phone]- can f/u lab results at that time if not still hospitalized     #Normocytic Anemia  -patient presenting w/ mild anemia now worsening  -hgb dropped from 11.3 to 9.9 in one day   -recently started on full dose AC LMWH for PE  -patient also being flooded w/ fluids r/t contrast induced NAEL  -dilutional versus acute blood loss?  -r/o any s/s of acute bleeding  -consider FOBT  -maintain hgb >7 or if symptomatic       Will continue to monitor the patient.  Please call with any questions [phone]  Above reviewed with Attending Dr. Patrice ALLEN/[facility]  [address] [facility], [address], [place], [place]  [phone]  *Note not finalized until signed by Attending Physician

## 2023-10-05 NOTE — DIETITIAN INITIAL EVALUATION ADULT - PROBLEM SELECTOR PLAN 1
present with syncope x 2   CT head/C spine  ( I personally review) with no acute pathology or intracranial pathology  Presentation suggestive of vasovagal  Check orthostatic vitals, if positive, will consider IV fluid. Will hold of IV fluid for now given elevated BNP  CTA chest to R/O PE given h/o prior DVT  Telemetry monitoring, ECHO

## 2023-10-05 NOTE — DIETITIAN INITIAL EVALUATION ADULT - OTHER INFO
Per pt., she was on metformin and Trulicity, however stopped taking Trulicity x 3 months due to insurance issues.  Last A1C=8.2%.  Pt was not self monitoring blood glucose.  Pt is aware SMBG goals, was following up c Primary Care for diabetes as she feels that she did not benefit from seeing the Endocrinologist.

## 2023-10-05 NOTE — PROGRESS NOTE ADULT - ASSESSMENT
42 y/o F w/ PMH of HTN, DM, CAD, HLD , obesity, hx DVT in 2007 s/p 6 mth AC (had dvt LLE due to OCP per pt)  In ED found to have b/l PE w/ RHS    CT head/maxillofacial/Cspine with no acute fracture or intracranial hemorrhage. Noted indeterminate lucent lesion within the right side of the clivus and basiocciput with surrounding sclerotic change. Additional indeterminate expansile lucent lesion within the right parietal calvarium. Infection and neoplasm are within the differential diagnosis for both of these lesions. Metastatic lesions cannot be excluded 42 y/o F w/ PMH of HTN, DM, CAD, HLD , obesity, hx DVT in 2007 s/p 6 mth AC (had dvt LLE due to OCP per pt),     In ED found to have b/l PE w/ RHS    CT head/maxillofacial/Cspine with no acute fracture or intracranial hemorrhage. Noted indeterminate lucent lesion within the right side of the clivus and basiocciput with surrounding sclerotic change. Additional indeterminate expansile lucent lesion within the right parietal calvarium. Infection and neoplasm are within the differential diagnosis for both of these lesions. Metastatic lesions cannot be excluded.     Will change Lovenox to Eliquis tonight load for 10 days.     Discharge home tomorrow, Heme follow up as outpatient.     CBC in AM to verify stable HGB.     Creatinine normal at 1.22, stop IVF.

## 2023-10-05 NOTE — PROGRESS NOTE ADULT - SUBJECTIVE AND OBJECTIVE BOX
NEPHROLOGY PROGRESS NOTE    CHIEF COMPLAINT:  NAEL    HPI:  Renal function better    EXAM:  Vital Signs Last 24 Hrs  T(C): 36.9 (05 Oct 2023 11:10), Max: 36.9 (05 Oct 2023 11:10)  T(F): 98.5 (05 Oct 2023 11:10), Max: 98.5 (05 Oct 2023 11:10)  HR: 91 (05 Oct 2023 11:10) (91 - 100)  BP: 119/83 (05 Oct 2023 11:10) (96/65 - 125/83)  BP(mean): --  RR: 18 (05 Oct 2023 11:10) (18 - 18)  SpO2: 96% (05 Oct 2023 11:10) (94% - 96%)    Parameters below as of 04 Oct 2023 16:22  Patient On (Oxygen Delivery Method): nasal cannula  O2 Flow (L/min): 2    I&O's Summary    04 Oct 2023 07:01  -  05 Oct 2023 07:00  --------------------------------------------------------  IN: 670 mL / OUT: 0 mL / NET: 670 mL      Daily     Daily Weight in k.9 (05 Oct 2023 05:21)    Conversant, in no apparent distress  Normal respiratory effort, lungs clear bilaterally  Heart RRR with no murmur, no peripheral edema    LABS                        9.9    6.33  )-----------( 290      ( 05 Oct 2023 08:03 )             30.4     10-05    134<L>  |  104  |  25<H>  ----------------------------<  194<H>  4.3   |  25  |  1.22    Ca    8.4<L>      05 Oct 2023 08:03    TPro  7.3  /  Alb  3.5<L>  /  TBili  x   /  DBili  x   /  AST  x   /  ALT  x   /  AlkPhos  x   10-03        Assessment   NAEL pre renal azotemia; contrast nephropathy  Related hyperkalemia   Pulmonary embolism     Plan  DC IVF  Check BMP in AM  If renal function back to baseline and K normal may resume ACE for long term renoprotection

## 2023-10-05 NOTE — DIETITIAN INITIAL EVALUATION ADULT - PROBLEM SELECTOR PLAN 7
Patient reported h/o nonobstructive CAD, had Cath in around [phone] at [facility] with 20% stenosis, no stent. Patient is following with outpatient cardiology and had normal exercise treadmill test in 2022 per patient  continue aspirin, statin, BB

## 2023-10-05 NOTE — PROGRESS NOTE ADULT - SUBJECTIVE AND OBJECTIVE BOX
Heme/Onc Progress note    INTERVAL HPI/OVERNIGHT EVENTS:  Patient S&E at bedside. No o/n events, patient just anxious. No complaints at this time. Patient denies fever, chills, dizziness, weakness, CP, palpitations, SOB, cough, N/V/D/C, dysuria, changes in bowel movements, LE edema.    VITAL SIGNS:  T(F): 98.1 (10-05-23 @ 05:21)  HR: 94 (10-05-23 @ 05:21)  BP: 101/66 (10-05-23 @ 05:21)  RR: 18 (10-05-23 @ 05:21)  SpO2: 94% (10-05-23 @ 05:21)  Wt(kg): --    PHYSICAL EXAM:    Constitutional: NAD  Eyes: EOMI, sclera non-icteric  Neck: supple, no masses, no JVD  Respiratory: CTA b/l, good air entry b/l  Cardiovascular: RRR, no M/R/G  Gastrointestinal: soft, NTND, no masses palpable, + BS,   Extremities: no c/c/e  Neurological: AAOx3      MEDICATIONS  (STANDING):  aspirin enteric coated 81 milliGRAM(s) Oral daily  dextrose 5%. 1000 milliLiter(s) (50 mL/Hr) IV Continuous <Continuous>  dextrose 5%. 1000 milliLiter(s) (100 mL/Hr) IV Continuous <Continuous>  dextrose 50% Injectable 12.5 Gram(s) IV Push once  dextrose 50% Injectable 25 Gram(s) IV Push once  dextrose 50% Injectable 25 Gram(s) IV Push once  enoxaparin Injectable 110 milliGRAM(s) SubCutaneous every 12 hours  glucagon  Injectable 1 milliGRAM(s) IntraMuscular once  influenza   Vaccine 0.5 milliLiter(s) IntraMuscular once  insulin glargine Injectable (LANTUS) 15 Unit(s) SubCutaneous at bedtime  insulin lispro (ADMELOG) corrective regimen sliding scale   SubCutaneous three times a day before meals  insulin lispro (ADMELOG) corrective regimen sliding scale   SubCutaneous at bedtime  LORazepam   Injectable 2 milliGRAM(s) IV Push once  metoprolol succinate ER 50 milliGRAM(s) Oral daily  simvastatin 20 milliGRAM(s) Oral at bedtime  sodium chloride 0.9%. 1000 milliLiter(s) (75 mL/Hr) IV Continuous <Continuous>    MEDICATIONS  (PRN):  acetaminophen     Tablet .. 650 milliGRAM(s) Oral every 6 hours PRN Mild Pain (1 - 3), Moderate Pain (4 - 6)  dextrose Oral Gel 15 Gram(s) Oral once PRN Blood Glucose LESS THAN 70 milliGRAM(s)/deciliter  melatonin 3 milliGRAM(s) Oral at bedtime PRN Insomnia      Allergies    No Known Allergies    Intolerances        LABS:                        9.9    6.33  )-----------( 290      ( 05 Oct 2023 08:03 )             30.4     10    133<L>  |  101  |  32<H>  ----------------------------<  207<H>  5.7<H>   |  24  |  1.60<H>    Ca    8.6      04 Oct 2023 07:47    TPro  7.3  /  Alb  3.5<L>  /  TBili  x   /  DBili  x   /  AST  x   /  ALT  x   /  AlkPhos  x   10-03    PTT - ( 03 Oct 2023 14:25 )  PTT:31.7 sec  Urinalysis Basic - ( 04 Oct 2023 07:47 )    Color: x / Appearance: x / SG: x / pH: x  Gluc: 207 mg/dL / Ketone: x  / Bili: x / Urobili: x   Blood: x / Protein: x / Nitrite: x   Leuk Esterase: x / RBC: x / WBC x   Sq Epi: x / Non Sq Epi: x / Bacteria: x      Protein Electrophoresis, Serum (10.03.23 @ 14:25)   Protein Total: 7.3 g/dL  Total Protein, Serum: 7.3 g/dL  Albumin: 3.5 g/dL  Alpha 1: 0.3 g/dL  Alpha 2: 0.9 g/dL  Beta Globulin: 1.0 g/dL  Gamma Globulin: 1.6 g/dL  % Albumin: 48.0 %  % Alpha 1: 4.6 %  % Alpha 2: 12.5 %  % Beta: 13.6 %  % Gamma: 21.3 %  Albumin/Globulin Ratio: 0.9 Ratio  Serum Protein Electrophoresis Interp: Normal Electrophoresis Pattern  Immunofixation, Serum (10.03.23 @ 14:25)   Immunofixation, Serum:   No Monoclonal Band Identified   Reference Range: None Detected  Immunoglobulins Panel (10.03.23 @ 14:25)   Quantitative Ig mg/dL  Quantitative IgA: 323 mg/dL  Quantitative IgM: 48 mg/dL  SKYLAR Kappa: 6.21 mg/dL  SKYLAR Lambda: 2.95 mg/dL  Pearcy/Lambda Free Light Chain Ratio, Serum: 2.11 Ratio      RADIOLOGY & ADDITIONAL TESTS:  Studies reviewed.    < from: CT Abdomen and Pelvis w/ IV Cont (10.04.23 @ 11:57) >  ACC: [id number] EXAM:  CT ABDOMEN AND PELVIS IC   ORDERED BY: SHARLENE NOLEN     PROCEDURE DATE:  10/04/2023          INTERPRETATION:  CLINICAL INFORMATION: 43 years  Female with occult   malignancy.    COMPARISON: CTA chest 10/2/2023    CONTRAST/COMPLICATIONS:  IV Contrast: Omnipaque 350  90 cc administered   10 cc discarded  Oral Contrast: NONE  Complications: None reported at time of study completion    PROCEDURE:  CT of the Abdomen and Pelvis was performed.  Sagittal and coronal reformats were performed.    FINDINGS:  LOWER CHEST: Redemonstrated bilateral lower lobe pulmonary emboli.    LIVER: Steatosis. Limited by motion artifact and streak artifact.  BILE DUCTS: Normal caliber.  GALLBLADDER: Possible sludge or stones in the lumen.  SPLEEN: Within normal limits.  PANCREAS: Within normal limits.  ADRENALS: Within normal limits.  KIDNEYS/URETERS: Bilateral renal cortical scarring. No   hydroureteronephrosis.    BLADDER: Within normal limits.  REPRODUCTIVE ORGANS: IUD in the uterus. 2.5 cm pedunculated right fundal   myoma. 1.8 cm right ovarian follicle.    BOWEL: No bowel obstruction. Appendix is normal. Pancreas colonic   diverticulosis without diverticulitis.  PERITONEUM: Trace pelvic ascites.  VESSELS: Within normal limits.  RETROPERITONEUM/LYMPH NODES: No lymphadenopathy.  ABDOMINAL WALL: Small fat-containing umbilical hernia.  BONES: Lower lumbar degenerative changes.    IMPRESSION:  Redemonstrated pulmonary emboli.    Hepatic steatosis. Evaluation for mass is limited by motion artifact and   streak artifact. If hepatic pathology is of clinical concern, consider   follow-up MRI.    Possible gallstones or sludge. Correlate with MRI.    IUD in the uterus. 1.8 cm right ovarian follicle. Pedunculated fundal   myoma.        --- End of Report ---

## 2023-10-05 NOTE — DIETITIAN INITIAL EVALUATION ADULT - PERTINENT MEDS FT
MEDICATIONS  (STANDING):  aspirin enteric coated 81 milliGRAM(s) Oral daily  dextrose 5%. 1000 milliLiter(s) (50 mL/Hr) IV Continuous <Continuous>  dextrose 5%. 1000 milliLiter(s) (100 mL/Hr) IV Continuous <Continuous>  dextrose 50% Injectable 12.5 Gram(s) IV Push once  dextrose 50% Injectable 25 Gram(s) IV Push once  dextrose 50% Injectable 25 Gram(s) IV Push once  enoxaparin Injectable 110 milliGRAM(s) SubCutaneous every 12 hours  glucagon  Injectable 1 milliGRAM(s) IntraMuscular once  influenza   Vaccine 0.5 milliLiter(s) IntraMuscular once  insulin glargine Injectable (LANTUS) 15 Unit(s) SubCutaneous at bedtime  insulin lispro (ADMELOG) corrective regimen sliding scale   SubCutaneous three times a day before meals  insulin lispro (ADMELOG) corrective regimen sliding scale   SubCutaneous at bedtime  metoprolol succinate ER 50 milliGRAM(s) Oral daily  simvastatin 20 milliGRAM(s) Oral at bedtime    MEDICATIONS  (PRN):  acetaminophen     Tablet .. 650 milliGRAM(s) Oral every 6 hours PRN Mild Pain (1 - 3), Moderate Pain (4 - 6)  dextrose Oral Gel 15 Gram(s) Oral once PRN Blood Glucose LESS THAN 70 milliGRAM(s)/deciliter  melatonin 3 milliGRAM(s) Oral at bedtime PRN Insomnia

## 2023-10-05 NOTE — DIETITIAN INITIAL EVALUATION ADULT - NS FNS DIET ORDER
Diet, DASH/TLC:   Sodium & Cholesterol Restricted  Consistent Carbohydrate {No Snacks} (10-02-23 @ 15:54) [Active]

## 2023-10-05 NOTE — DIETITIAN INITIAL EVALUATION ADULT - PROBLEM SELECTOR PLAN 2
hsTnT 91.5  EKG with sinus tachy. ? septal infarct  No chest pain  Patient reported h/o nonobstructive CAD, had Cath in around [id number] at [facility] with 20% stenosis, no stent. Patient is following with outpatient cardiology and had normal exercise treadmill test in 2022 per patient  continue aspirin, statin, BB  serial trop/CK/CKMB, ECHO, telemetry monitoring

## 2023-10-05 NOTE — DIETITIAN INITIAL EVALUATION ADULT - PROBLEM SELECTOR PLAN 4
CT head/maxillofacial/Cspine  ( I personally review) Noted indeterminate lucent lesion within the right side of the clivus and basiocciput with surrounding sclerotic change. Additional indeterminate expansile lucent lesion within the right parietal calvarium. Infection and neoplasm are within the differential diagnosis for both of these lesions. Metastatic lesions cannot be excluded  MRI brain/C spine with IV contrast, IV ativan for sedation. Patient has claustrophobia   Pending CTA chest to R/O PE, will be able to evaluate chest pathology as well  Consider hematology/oncology consult in AM

## 2023-10-05 NOTE — PROGRESS NOTE ADULT - SUBJECTIVE AND OBJECTIVE BOX
INTERVAL HPI/OVERNIGHT EVENTS:  Pt seen and examined at bedside.     Allergies/Intolerance: No Known Allergies      MEDICATIONS  (STANDING):  aspirin enteric coated 81 milliGRAM(s) Oral daily  dextrose 5%. 1000 milliLiter(s) (50 mL/Hr) IV Continuous <Continuous>  dextrose 5%. 1000 milliLiter(s) (100 mL/Hr) IV Continuous <Continuous>  dextrose 50% Injectable 25 Gram(s) IV Push once  dextrose 50% Injectable 12.5 Gram(s) IV Push once  dextrose 50% Injectable 25 Gram(s) IV Push once  enoxaparin Injectable 110 milliGRAM(s) SubCutaneous every 12 hours  glucagon  Injectable 1 milliGRAM(s) IntraMuscular once  influenza   Vaccine 0.5 milliLiter(s) IntraMuscular once  insulin glargine Injectable (LANTUS) 15 Unit(s) SubCutaneous at bedtime  insulin lispro (ADMELOG) corrective regimen sliding scale   SubCutaneous three times a day before meals  insulin lispro (ADMELOG) corrective regimen sliding scale   SubCutaneous at bedtime  LORazepam   Injectable 2 milliGRAM(s) IV Push once  metoprolol succinate ER 50 milliGRAM(s) Oral daily  simvastatin 20 milliGRAM(s) Oral at bedtime  sodium chloride 0.9%. 1000 milliLiter(s) (75 mL/Hr) IV Continuous <Continuous>    MEDICATIONS  (PRN):  acetaminophen     Tablet .. 650 milliGRAM(s) Oral every 6 hours PRN Mild Pain (1 - 3), Moderate Pain (4 - 6)  dextrose Oral Gel 15 Gram(s) Oral once PRN Blood Glucose LESS THAN 70 milliGRAM(s)/deciliter  melatonin 3 milliGRAM(s) Oral at bedtime PRN Insomnia        ROS: all systems reviewed and wnl      PHYSICAL EXAMINATION:  Vital Signs Last 24 Hrs  T(C): 36.7 (05 Oct 2023 05:21), Max: 37.1 (04 Oct 2023 10:51)  T(F): 98.1 (05 Oct 2023 05:21), Max: 98.8 (04 Oct 2023 10:51)  HR: 94 (05 Oct 2023 05:21) (94 - 106)  BP: 101/66 (05 Oct 2023 05:21) (94/61 - 125/83)  BP(mean): --  RR: 18 (05 Oct 2023 05:21) (18 - 18)  SpO2: 94% (05 Oct 2023 05:21) (86% - 95%)    Parameters below as of 04 Oct 2023 16:22  Patient On (Oxygen Delivery Method): nasal cannula  O2 Flow (L/min): 2    CAPILLARY BLOOD GLUCOSE      POCT Blood Glucose.: 206 mg/dL (05 Oct 2023 08:01)  POCT Blood Glucose.: 263 mg/dL (04 Oct 2023 21:20)  POCT Blood Glucose.: 252 mg/dL (04 Oct 2023 16:38)  POCT Blood Glucose.: 281 mg/dL (04 Oct 2023 11:16)      10-04 @ 07:01  -  10-05 @ 07:00  --------------------------------------------------------  IN: 670 mL / OUT: 0 mL / NET: 670 mL        GENERAL:   NECK: supple, No JVD  CHEST/LUNG: clear to auscultation bilaterally; no rales, rhonchi, or wheezing b/l  HEART: normal S1, S2  ABDOMEN: BS+, soft, ND, NT   EXTREMITIES:  pulses palpable; no clubbing, cyanosis, or edema b/l LEs    LABS:                        9.9    6.33  )-----------( 290      ( 05 Oct 2023 08:03 )             30.4     10-05    134<L>  |  104  |  25<H>  ----------------------------<  194<H>  4.3   |  25  |  1.22    Ca    8.4<L>      05 Oct 2023 08:03    TPro  7.3  /  Alb  3.5<L>  /  TBili  x   /  DBili  x   /  AST  x   /  ALT  x   /  AlkPhos  x   10-03    PTT - ( 03 Oct 2023 14:25 )  PTT:31.7 sec  Urinalysis Basic - ( 05 Oct 2023 08:03 )    Color: x / Appearance: x / SG: x / pH: x  Gluc: 194 mg/dL / Ketone: x  / Bili: x / Urobili: x   Blood: x / Protein: x / Nitrite: x   Leuk Esterase: x / RBC: x / WBC x   Sq Epi: x / Non Sq Epi: x / Bacteria: x             INTERVAL HPI/OVERNIGHT EVENTS:  Pt seen and examined at bedside.     Allergies/Intolerance: No Known Allergies      MEDICATIONS  (STANDING):  aspirin enteric coated 81 milliGRAM(s) Oral daily  dextrose 5%. 1000 milliLiter(s) (50 mL/Hr) IV Continuous <Continuous>  dextrose 5%. 1000 milliLiter(s) (100 mL/Hr) IV Continuous <Continuous>  dextrose 50% Injectable 25 Gram(s) IV Push once  dextrose 50% Injectable 12.5 Gram(s) IV Push once  dextrose 50% Injectable 25 Gram(s) IV Push once  enoxaparin Injectable 110 milliGRAM(s) SubCutaneous every 12 hours  glucagon  Injectable 1 milliGRAM(s) IntraMuscular once  influenza   Vaccine 0.5 milliLiter(s) IntraMuscular once  insulin glargine Injectable (LANTUS) 15 Unit(s) SubCutaneous at bedtime  insulin lispro (ADMELOG) corrective regimen sliding scale   SubCutaneous three times a day before meals  insulin lispro (ADMELOG) corrective regimen sliding scale   SubCutaneous at bedtime  LORazepam   Injectable 2 milliGRAM(s) IV Push once  metoprolol succinate ER 50 milliGRAM(s) Oral daily  simvastatin 20 milliGRAM(s) Oral at bedtime  sodium chloride 0.9%. 1000 milliLiter(s) (75 mL/Hr) IV Continuous <Continuous>    MEDICATIONS  (PRN):  acetaminophen     Tablet .. 650 milliGRAM(s) Oral every 6 hours PRN Mild Pain (1 - 3), Moderate Pain (4 - 6)  dextrose Oral Gel 15 Gram(s) Oral once PRN Blood Glucose LESS THAN 70 milliGRAM(s)/deciliter  melatonin 3 milliGRAM(s) Oral at bedtime PRN Insomnia        ROS: all systems reviewed and wnl      PHYSICAL EXAMINATION:  Vital Signs Last 24 Hrs  T(C): 36.7 (05 Oct 2023 05:21), Max: 37.1 (04 Oct 2023 10:51)  T(F): 98.1 (05 Oct 2023 05:21), Max: 98.8 (04 Oct 2023 10:51)  HR: 94 (05 Oct 2023 05:21) (94 - 106)  BP: 101/66 (05 Oct 2023 05:21) (94/61 - 125/83)  BP(mean): --  RR: 18 (05 Oct 2023 05:21) (18 - 18)  SpO2: 94% (05 Oct 2023 05:21) (86% - 95%)    Parameters below as of 04 Oct 2023 16:22  Patient On (Oxygen Delivery Method): nasal cannula  O2 Flow (L/min): 2    CAPILLARY BLOOD GLUCOSE      POCT Blood Glucose.: 206 mg/dL (05 Oct 2023 08:01)  POCT Blood Glucose.: 263 mg/dL (04 Oct 2023 21:20)  POCT Blood Glucose.: 252 mg/dL (04 Oct 2023 16:38)  POCT Blood Glucose.: 281 mg/dL (04 Oct 2023 11:16)      10-04 @ 07:01  -  10-05 @ 07:00  --------------------------------------------------------  IN: 670 mL / OUT: 0 mL / NET: 670 mL        GENERAL: stable on RA, no CP or SOB  NECK: supple, No JVD  CHEST/LUNG: clear to auscultation bilaterally; no rales, rhonchi, or wheezing b/l  HEART: normal S1, S2  ABDOMEN: BS+, soft, ND, NT   EXTREMITIES:  pulses palpable; no clubbing, cyanosis, or edema b/l LEs    LABS:                        9.9    6.33  )-----------( 290      ( 05 Oct 2023 08:03 )             30.4     10-05    134<L>  |  104  |  25<H>  ----------------------------<  194<H>  4.3   |  25  |  1.22    Ca    8.4<L>      05 Oct 2023 08:03    TPro  7.3  /  Alb  3.5<L>  /  TBili  x   /  DBili  x   /  AST  x   /  ALT  x   /  AlkPhos  x   10-03    PTT - ( 03 Oct 2023 14:25 )  PTT:31.7 sec  Urinalysis Basic - ( 05 Oct 2023 08:03 )    Color: x / Appearance: x / SG: x / pH: x  Gluc: 194 mg/dL / Ketone: x  / Bili: x / Urobili: x   Blood: x / Protein: x / Nitrite: x   Leuk Esterase: x / RBC: x / WBC x   Sq Epi: x / Non Sq Epi: x / Bacteria: x

## 2023-10-06 ENCOUNTER — TRANSCRIPTION ENCOUNTER (OUTPATIENT)
Age: 43
End: 2023-10-06

## 2023-10-06 VITALS
TEMPERATURE: 99 F | DIASTOLIC BLOOD PRESSURE: 95 MMHG | OXYGEN SATURATION: 94 % | SYSTOLIC BLOOD PRESSURE: 136 MMHG | HEART RATE: 97 BPM | RESPIRATION RATE: 18 BRPM

## 2023-10-06 PROBLEM — E11.9 TYPE 2 DIABETES MELLITUS WITHOUT COMPLICATIONS: Chronic | Status: ACTIVE | Noted: 2023-10-02

## 2023-10-06 PROBLEM — I10 ESSENTIAL (PRIMARY) HYPERTENSION: Chronic | Status: ACTIVE | Noted: 2023-10-02

## 2023-10-06 LAB
ANION GAP SERPL CALC-SCNC: 8 MMOL/L — SIGNIFICANT CHANGE UP (ref 5–17)
BUN SERPL-MCNC: 15 MG/DL — SIGNIFICANT CHANGE UP (ref 7–23)
CALCIUM SERPL-MCNC: 8.7 MG/DL — SIGNIFICANT CHANGE UP (ref 8.5–10.1)
CHLORIDE SERPL-SCNC: 106 MMOL/L — SIGNIFICANT CHANGE UP (ref 96–108)
CO2 SERPL-SCNC: 22 MMOL/L — SIGNIFICANT CHANGE UP (ref 22–31)
CREAT SERPL-MCNC: 1.02 MG/DL — SIGNIFICANT CHANGE UP (ref 0.5–1.3)
CREATININE, URINE RESULT: 166 MG/DL — SIGNIFICANT CHANGE UP
EGFR: 70 ML/MIN/1.73M2 — SIGNIFICANT CHANGE UP
FOLATE SERPL-MCNC: 16.1 NG/ML — SIGNIFICANT CHANGE UP
GLUCOSE BLDC GLUCOMTR-MCNC: 183 MG/DL — HIGH (ref 70–99)
GLUCOSE BLDC GLUCOMTR-MCNC: 216 MG/DL — HIGH (ref 70–99)
GLUCOSE BLDC GLUCOMTR-MCNC: 220 MG/DL — HIGH (ref 70–99)
GLUCOSE SERPL-MCNC: 159 MG/DL — HIGH (ref 70–99)
HCT VFR BLD CALC: 30.1 % — LOW (ref 34.5–45)
HGB BLD-MCNC: 9.8 G/DL — LOW (ref 11.5–15.5)
MCHC RBC-ENTMCNC: 29.4 PG — SIGNIFICANT CHANGE UP (ref 27–34)
MCHC RBC-ENTMCNC: 32.6 G/DL — SIGNIFICANT CHANGE UP (ref 32–36)
MCV RBC AUTO: 90.4 FL — SIGNIFICANT CHANGE UP (ref 80–100)
NRBC # BLD: 0 /100 WBCS — SIGNIFICANT CHANGE UP (ref 0–0)
PLATELET # BLD AUTO: 309 K/UL — SIGNIFICANT CHANGE UP (ref 150–400)
POTASSIUM SERPL-MCNC: 4.4 MMOL/L — SIGNIFICANT CHANGE UP (ref 3.5–5.3)
POTASSIUM SERPL-SCNC: 4.4 MMOL/L — SIGNIFICANT CHANGE UP (ref 3.5–5.3)
PROT ?TM UR-MCNC: 36 MG/DL — HIGH (ref 0–12)
RBC # BLD: 3.33 M/UL — LOW (ref 3.8–5.2)
RBC # FLD: 13.1 % — SIGNIFICANT CHANGE UP (ref 10.3–14.5)
SODIUM SERPL-SCNC: 136 MMOL/L — SIGNIFICANT CHANGE UP (ref 135–145)
VIT B12 SERPL-MCNC: 299 PG/ML — SIGNIFICANT CHANGE UP (ref 232–1245)
WBC # BLD: 5.44 K/UL — SIGNIFICANT CHANGE UP (ref 3.8–10.5)
WBC # FLD AUTO: 5.44 K/UL — SIGNIFICANT CHANGE UP (ref 3.8–10.5)

## 2023-10-06 PROCEDURE — 99239 HOSP IP/OBS DSCHRG MGMT >30: CPT

## 2023-10-06 RX ORDER — METFORMIN HYDROCHLORIDE 850 MG/1
1 TABLET ORAL
Refills: 0 | DISCHARGE

## 2023-10-06 RX ORDER — INSULIN LISPRO 100/ML
3 VIAL (ML) SUBCUTANEOUS
Qty: 1 | Refills: 2
Start: 2023-10-06 | End: 2024-01-03

## 2023-10-06 RX ORDER — INSULIN DETEMIR 100/ML (3)
15 INSULIN PEN (ML) SUBCUTANEOUS
Qty: 1 | Refills: 2
Start: 2023-10-06 | End: 2024-01-03

## 2023-10-06 RX ORDER — APIXABAN 2.5 MG/1
2 TABLET, FILM COATED ORAL
Qty: 74 | Refills: 0
Start: 2023-10-06 | End: 2023-10-12

## 2023-10-06 RX ORDER — IRON SUCROSE 20 MG/ML
300 INJECTION, SOLUTION INTRAVENOUS ONCE
Refills: 0 | Status: COMPLETED | OUTPATIENT
Start: 2023-10-06 | End: 2023-10-06

## 2023-10-06 RX ORDER — FERROUS SULFATE 325(65) MG
1 TABLET ORAL
Qty: 30 | Refills: 0
Start: 2023-10-06 | End: 2023-11-04

## 2023-10-06 RX ORDER — INSULIN GLARGINE 100 [IU]/ML
15 INJECTION, SOLUTION SUBCUTANEOUS
Qty: 15 | Refills: 2
Start: 2023-10-06 | End: 2024-01-03

## 2023-10-06 RX ORDER — INSULIN GLARGINE 100 [IU]/ML
15 INJECTION, SOLUTION SUBCUTANEOUS
Qty: 1 | Refills: 1
Start: 2023-10-06 | End: 2023-12-04

## 2023-10-06 RX ORDER — ISOPROPYL ALCOHOL, BENZOCAINE .7; .06 ML/ML; ML/ML
0 SWAB TOPICAL
Qty: 100 | Refills: 1
Start: 2023-10-06

## 2023-10-06 RX ORDER — FERROUS SULFATE 325(65) MG
325 TABLET ORAL DAILY
Refills: 0 | Status: DISCONTINUED | OUTPATIENT
Start: 2023-10-06 | End: 2023-10-06

## 2023-10-06 RX ADMIN — IRON SUCROSE 176.67 MILLIGRAM(S): 20 INJECTION, SOLUTION INTRAVENOUS at 12:11

## 2023-10-06 RX ADMIN — Medication 4: at 11:55

## 2023-10-06 RX ADMIN — Medication 81 MILLIGRAM(S): at 11:55

## 2023-10-06 RX ADMIN — Medication 50 MILLIGRAM(S): at 05:33

## 2023-10-06 RX ADMIN — INFLUENZA VIRUS VACCINE 0.5 MILLILITER(S): 15; 15; 15; 15 SUSPENSION INTRAMUSCULAR at 18:13

## 2023-10-06 RX ADMIN — APIXABAN 10 MILLIGRAM(S): 2.5 TABLET, FILM COATED ORAL at 05:32

## 2023-10-06 RX ADMIN — Medication 4: at 16:56

## 2023-10-06 RX ADMIN — APIXABAN 10 MILLIGRAM(S): 2.5 TABLET, FILM COATED ORAL at 16:57

## 2023-10-06 NOTE — DISCHARGE NOTE PROVIDER - NSFOLLOWUPCLINICSTOKEN_GEN_ALL_ED_FT
825880:1 week|| ||00\01||False;861452:1 month|| ||00\01||False; 165205:1 week|| ||00\01||False;[id number]: ||10/31/2023||14\00\00||False;

## 2023-10-06 NOTE — DISCHARGE NOTE PROVIDER - NSDCFUSCHEDAPPT_GEN_ALL_CORE_FT
Quentin Jimenez  [facility]  END[address]CRIN [address]  Scheduled Appointment: 10/31/2023

## 2023-10-06 NOTE — PROGRESS NOTE ADULT - SUBJECTIVE AND OBJECTIVE BOX
[facility] NEPHROLOGY SERVICES, [facility]  NEPHROLOGY AND HYPERTENSION  [address]  [address]  [place] [address]  [phone]    MD AZEEM BAH MD YELENA ROSENBERG, MD BINNY KOSHY, MD CHRISTOPHER CAPUTO, MD EDWARD BOVER, MD          Patient events noted    MEDICATIONS  (STANDING):  apixaban 10 milliGRAM(s) Oral every 12 hours  aspirin enteric coated 81 milliGRAM(s) Oral daily  dextrose 5%. 1000 milliLiter(s) (50 mL/Hr) IV Continuous <Continuous>  dextrose 5%. 1000 milliLiter(s) (100 mL/Hr) IV Continuous <Continuous>  dextrose 50% Injectable 12.5 Gram(s) IV Push once  dextrose 50% Injectable 25 Gram(s) IV Push once  dextrose 50% Injectable 25 Gram(s) IV Push once  glucagon  Injectable 1 milliGRAM(s) IntraMuscular once  influenza   Vaccine 0.5 milliLiter(s) IntraMuscular once  insulin glargine Injectable (LANTUS) 15 Unit(s) SubCutaneous at bedtime  insulin lispro (ADMELOG) corrective regimen sliding scale   SubCutaneous three times a day before meals  insulin lispro (ADMELOG) corrective regimen sliding scale   SubCutaneous at bedtime  metoprolol succinate ER 50 milliGRAM(s) Oral daily  simvastatin 20 milliGRAM(s) Oral at bedtime    MEDICATIONS  (PRN):  acetaminophen     Tablet .. 650 milliGRAM(s) Oral every 6 hours PRN Mild Pain (1 - 3), Moderate Pain (4 - 6)  dextrose Oral Gel 15 Gram(s) Oral once PRN Blood Glucose LESS THAN 70 milliGRAM(s)/deciliter  melatonin 3 milliGRAM(s) Oral at bedtime PRN Insomnia      PHYSICAL EXAM:      T(C): 36.7 (10-06-23 @ 10:15), Max: 36.9 (10-06-23 @ 04:55)  HR: 89 (10-06-23 @ 10:15) (89 - 97)  BP: 122/83 (10-06-23 @ 10:15) (119/83 - 122/83)  RR: 18 (10-06-23 @ 10:15) (18 - 18)  SpO2: 94% (10-06-23 @ 10:15) (94% - 94%)  Wt(kg): --  Lungs clear  Heart S1S2  Abd soft NT ND  Extremities:   tr edema                                    9.8    5.44  )-----------( 309      ( 06 Oct 2023 08:06 )             30.1     10-06    136  |  106  |  15  ----------------------------<  159<H>  4.4   |  22  |  1.02    Ca    8.7      06 Oct 2023 08:06          Creatinine Trend: 1.02<--, 1.22<--, 1.60<--, 1.28<--, 1.09<--      Assessment   NAEL pre renal azotemia, contrast effect  Improved     Plan:  Discharge planning     Wenceslao Chavarria MD

## 2023-10-06 NOTE — DISCHARGE NOTE PROVIDER - NSDCFUADDAPPT_GEN_ALL_CORE_FT
Oncology as above in 2 weeks.  Oncology as above in 2 weeks.     Endocrinology appt scheduled for 10/31/23 at 2pm

## 2023-10-06 NOTE — PROGRESS NOTE ADULT - ASSESSMENT
43 years old female with h/o HTN, HLD, DM, CAD, h/o DVT 2007 ( due to BC pills, treated with coumadin x 6 months, d/c by hematology, obesity present to ED with complain of syncope x 2.    #PE  -patient w/ hx of provoked DVT in 2007 r/t birth control as per patient was on coumadin x6 months and then discontinued by hematologist  -denies any other personal or familial hx of blood clots, denies hx of miscarriages, or still births  -US B/L LE- negative for DVT  - apls w/u (-)  -Transitioned to Eliquis  -Patient will need OP f/u with Heme/ONC Dr. Ibrahim 11/2 9:30am [facility] [phone] when deemed stable for d/c     #Malig w/u  -patient presented s/p syncopal episode prior to stated she was in good health.  -denies any weight loss, fever or chills. Stated she still menstruates w/ normal period and is up to date w/ pap smears and mammograms although stated she was told she has dense breast and due for 6 month check up of US). Denies any previous hx of colonoscopy.  -patient does admit to familial hx of bladder cancer from her father   -patient personally assessed not obv masses or nodules noted.  -CTA-C showing PE w/ mild to mod. R heart strain, w/o any enlarged LN's  or obv pulm nodules  -CT-C spine/ maxillofacial and H showing  Indeterminate lucent lesion within the right side of the clivus and basiocciput with surrounding sclerotic change. Additional indeterminate expansile lucent lesion within the right parietal calvarium (infection vs neoplasm)  -MRI-H/ c-spine (10/3) showing Right posterior MCA distribution remote infarction. Right clavicle osseous lesion is indeterminate, differential diagnosis   including metastasis chronic osteomyelitis and primary bone tumor. Expansile lesion right parietal bone is also indeterminate, more typical   of benign primary tumor but conceivably metastasis. Shallow right sphenoid meningocele  -rec neuro eval  -CT A/PHepatic steatosis, Possible gallstones or sludge. Correlate with MRI, IUD in the uterus. 1.8 cm right ovarian follicle, and Pedunculated fundal myoma.  -spep- NL Electrophoresis pattern,  without M-spike, IgG-1737, FLC ratio-2.11, B2M-3.6  -pending upep  -if patient stable for d/c Patient will need OP f/u with Heme/ONC Dr[facility] Patrice [facility] [phone]- can f/u lab results at that time if not still hospitalized     #Normocytic Anemia  -patient presenting w/ mild anemia now worsening  -hgb dropped from 11.3 to 9.9 in one day   -recently started on full dose AC LMWH for PE  -patient also being flooded w/ fluids r/t contrast induced NAEL  -dilutional versus acute blood loss?  -denies menstrual cycles, melena or hematochezia   -some PAPI noted-can give venofer 300mg   -r/o any s/s of acute bleeding  -consider FOBT of hgb doesnt improve   -pending am labs   -maintain hgb >7 or if symptomatic       Will continue to monitor the patient.  Please call with any questions [phone]  Above reviewed with Attending Dr. Patrice ALLEN/[facility]c  [address], [address], [place], [place]  [phone]  *Note not finalized until signed by Attending Physician

## 2023-10-06 NOTE — PROGRESS NOTE ADULT - ASSESSMENT
42 y/o F w/ PMH of HTN, DM, CAD, HLD , obesity, hx DVT in 2007 s/p 6 mth AC (had dvt LLE due to OCP per pt),     In ED found to have b/l PE w/ RHS    CT head/maxillofacial/Cspine with no acute fracture or intracranial hemorrhage. Noted indeterminate lucent lesion within the right side of the clivus and basiocciput with surrounding sclerotic change. Additional indeterminate expansile lucent lesion within the right parietal calvarium. Infection and neoplasm are within the differential diagnosis for both of these lesions. Metastatic lesions cannot be excluded.     Will change Lovenox to Eliquis tonight load for 10 days.     Discharge home tomorrow, Heme follow up as outpatient.     CBC in AM to verify stable HGB.     Creatinine normal at 1.22, stop IVF.  42 y/o F w/ PMH of HTN, DM, CAD, HLD , obesity, hx DVT in 2007 s/p 6 mth AC (had dvt LLE due to OCP per pt),     In ED found to have b/l PE w/ RHS    CT head/maxillofacial/Cspine with no acute fracture or intracranial hemorrhage. Noted indeterminate lucent lesion within the right side of the clivus and basiocciput with surrounding sclerotic change. Additional indeterminate expansile lucent lesion within the right parietal calvarium. Infection and neoplasm are within the differential diagnosis for both of these lesions. Metastatic lesions cannot be excluded.     Will change Lovenox to Eliquis tonight load for 10 days, then 5 mg bid long term.      Discharge home tomorrow, Heme follow up as outpatient.     CBC in AM to verify stable HGB.     Creatinine normal at 1.22, stop IVF.     Added daily iron and IV Venofer today x 1.     See Heme in one week.

## 2023-10-06 NOTE — PROGRESS NOTE ADULT - SUBJECTIVE AND OBJECTIVE BOX
INTERVAL HPI/OVERNIGHT EVENTS:  Pt seen and examined at bedside.     Allergies/Intolerance: No Known Allergies  lactose (Diarrhea)      MEDICATIONS  (STANDING):  apixaban 10 milliGRAM(s) Oral every 12 hours  aspirin enteric coated 81 milliGRAM(s) Oral daily  dextrose 5%. 1000 milliLiter(s) (50 mL/Hr) IV Continuous <Continuous>  dextrose 5%. 1000 milliLiter(s) (100 mL/Hr) IV Continuous <Continuous>  dextrose 50% Injectable 12.5 Gram(s) IV Push once  dextrose 50% Injectable 25 Gram(s) IV Push once  dextrose 50% Injectable 25 Gram(s) IV Push once  glucagon  Injectable 1 milliGRAM(s) IntraMuscular once  influenza   Vaccine 0.5 milliLiter(s) IntraMuscular once  insulin glargine Injectable (LANTUS) 15 Unit(s) SubCutaneous at bedtime  insulin lispro (ADMELOG) corrective regimen sliding scale   SubCutaneous three times a day before meals  insulin lispro (ADMELOG) corrective regimen sliding scale   SubCutaneous at bedtime  metoprolol succinate ER 50 milliGRAM(s) Oral daily  simvastatin 20 milliGRAM(s) Oral at bedtime    MEDICATIONS  (PRN):  acetaminophen     Tablet .. 650 milliGRAM(s) Oral every 6 hours PRN Mild Pain (1 - 3), Moderate Pain (4 - 6)  dextrose Oral Gel 15 Gram(s) Oral once PRN Blood Glucose LESS THAN 70 milliGRAM(s)/deciliter  melatonin 3 milliGRAM(s) Oral at bedtime PRN Insomnia        ROS: all systems reviewed and wnl      PHYSICAL EXAMINATION:  Vital Signs Last 24 Hrs  T(C): 36.9 (06 Oct 2023 04:55), Max: 36.9 (05 Oct 2023 11:10)  T(F): 98.5 (06 Oct 2023 04:55), Max: 98.5 (05 Oct 2023 11:10)  HR: 94 (06 Oct 2023 04:55) (91 - 97)  BP: 119/83 (06 Oct 2023 04:55) (119/83 - 129/66)  BP(mean): --  RR: 18 (06 Oct 2023 04:55) (18 - 18)  SpO2: 94% (06 Oct 2023 04:55) (94% - 96%)    Parameters below as of 05 Oct 2023 16:16  Patient On (Oxygen Delivery Method): nasal cannula  O2 Flow (L/min): 2    CAPILLARY BLOOD GLUCOSE      POCT Blood Glucose.: 183 mg/dL (06 Oct 2023 07:34)  POCT Blood Glucose.: 184 mg/dL (05 Oct 2023 21:26)  POCT Blood Glucose.: 254 mg/dL (05 Oct 2023 17:10)  POCT Blood Glucose.: 218 mg/dL (05 Oct 2023 10:58)        GENERAL: stable on RA, comfortable, no CP or SOB  NECK: supple, No JVD  CHEST/LUNG: clear to auscultation bilaterally; no rales, rhonchi, or wheezing b/l  HEART: normal S1, S2  ABDOMEN: BS+, soft, ND, NT   EXTREMITIES:  pulses palpable; no clubbing, cyanosis, or edema b/l LEs      LABS:                        9.9    6.33  )-----------( 290      ( 05 Oct 2023 08:03 )             30.4     10-05    134<L>  |  104  |  25<H>  ----------------------------<  194<H>  4.3   |  25  |  1.22    Ca    8.4<L>      05 Oct 2023 08:03        Urinalysis Basic - ( 05 Oct 2023 08:03 )    Color: x / Appearance: x / SG: x / pH: x  Gluc: 194 mg/dL / Ketone: x  / Bili: x / Urobili: x   Blood: x / Protein: x / Nitrite: x   Leuk Esterase: x / RBC: x / WBC x   Sq Epi: x / Non Sq Epi: x / Bacteria: x

## 2023-10-06 NOTE — DISCHARGE NOTE PROVIDER - HOSPITAL COURSE
43 year old female with h/o HTN, HLD, DM, CAD, h/o DVT 2007 ( due to OC pills, treated with coumadin x 6 months, d/c by hematology, obesity present to ED with complain of syncope x 2. While walking to dental appointment, patient felt blurry vision, lightheadedness, fell forward and had once syncope episode. Patient got up, walked short distance to her Uber, then had another episode. No chest pain, diaphoresis. Patient has polydipsia recently.  Tachycardic upon arrival, afebrile, sat well at RA. EKG with sinus tachy. ? septal infarct. No leukocytosis, plty 287, K 5, Cr 1.09, hsTnT 91.5. CXR with no focal consolidation. Left LE doppler negative for DVT. CT head/maxillofacial/Cspine with no acute fracture or intracranial hemorrhage. Noted indeterminate lucent lesion within the right side of the clivus and basiocciput with surrounding sclerotic change. Additional indeterminate expansile lucent lesion within the right parietal calvarium. Infection and neoplasm are within the differential diagnosis for both of these lesions. Metastatic lesions cannot be excluded    SH: occasional alcohol use  FH: HTN, DM (02 Oct 2023 16:04)   43 year old female with h/o HTN, HLD, DM, CAD, h/o DVT 2007 ( due to OC pills, treated with coumadin x 6 months, d/c by hematology, obesity present to ED with complain of syncope x 2. While walking to dental appointment, patient felt blurry vision, lightheadedness, fell forward and had once syncope episode. Patient got up, walked short distance to her Uber, then had another episode. No chest pain, diaphoresis.     Tachycardic upon arrival, afebrile, sat well at RA. EKG with sinus tachy. ? septal infarct. No leukocytosis, plty 287, K 5, Cr 1.09, hsTnT 91.5. CXR with no focal consolidation. Left LE doppler negative for DVT. CT head/maxillofacial/Cspine with no acute fracture or intracranial hemorrhage. Noted indeterminate lucent lesion within the right side of the clivus and basiocciput with surrounding sclerotic change. Additional indeterminate expansile lucent lesion within the right parietal calvarium. Infection and neoplasm are within the differential diagnosis for both of these lesions. Metastatic lesions cannot be excluded.     CTA confirmed bilateral PE, CT abdomen no malignancy. TTE normal LV, RV function.     Was started on Lovenox full dose, changed to Eliquis and discharged home.     Has iron deficiency anemia, added daily iron and IV Venofer. 43 year old female with h/o HTN, HLD, DM, CAD, h/o DVT 2007 ( due to OC pills, treated with coumadin x 6 months, d/c by hematology, obesity present to ED with complain of syncope x 2. While walking to dental appointment, patient felt blurry vision, lightheadedness, fell forward and had once syncope episode. Patient got up, walked short distance to her Uber, then had another episode. No chest pain, diaphoresis.     Tachycardic upon arrival, afebrile, sat well at RA. EKG with sinus tachy. ? septal infarct. No leukocytosis, plty 287, K 5, Cr 1.09, hsTnT 91.5. CXR with no focal consolidation. Left LE doppler negative for DVT. CT head/maxillofacial/Cspine with no acute fracture or intracranial hemorrhage. Noted indeterminate lucent lesion within the right side of the clivus and basiocciput with surrounding sclerotic change. Additional indeterminate expansile lucent lesion within the right parietal calvarium. Infection and neoplasm are within the differential diagnosis for both of these lesions. Metastatic lesions cannot be excluded.     CTA confirmed bilateral PE, CT abdomen no malignancy. TTE normal LV, RV function.     Was started on Lovenox full dose, changed to Eliquis and discharged home.     Has iron deficiency anemia, added daily iron and IV Venofer.     Diabetes type II- uncontrolled with hgb a1c of 12.1%- metformin dc'd, will send home on basal bolus insulin. Follow up with endocrinology in 1 week.

## 2023-10-06 NOTE — DISCHARGE NOTE PROVIDER - NSDCQMSTROKE_NEU_ALL_CORE
No Attending: Blayne    HPI:   63M with a PMHx of HTN, COPD, CKD (stage 4 CKD, has LUE AVF, not on HD yet), T-cell lymphoma (diagnosed 19 years ago, on chemo romidepsin every Wednesday), AFib (on Eliquis at home, last dose Saturday), systolic CHF (EF 40-45% 2017), severe pHTN who recently underwent a left chest wall hematoma evacuation on 12/18/2020 secondary to loculated effusion. On 1/18/21 patient presented to [facility] for pre-operative optimization with heparin gtt prior to OR 1/19 for VATS.     History as above, Pt is now POD3 s/p VATS decortication of loculated pleural effusion, pleurodesis. Postoperatively with fluid overload. Vascular surgery consulted for evaluation of AVF for possible HD. Pt has a radiocephalic fistula that was placed on 6/12/20, however has not needed to initiate HD yet. Per nephrology no indication for HD at this time. Pt denies any pain, weakness or abnormal sensation in hand or arm. Currently, patient states that he feels well overall. Denies SOB, pain largely controlled except for tugging of central line in neck, no CP, abd pain. OOB to chair.     PAST MEDICAL & SURGICAL HISTORY:  BPH (benign prostatic hyperplasia)    Gout    H/O pulmonary hypertension    CHF, chronic    Lymphoma  t cell; Chemotherapy weekly- wednesday    CKD (chronic kidney disease)    Atrial fibrillation    HTN (hypertension)    Elective surgery  rectal surgery    History of cholecystectomy      MEDICATIONS  (STANDING):  allopurinol 200 milliGRAM(s) Oral two times a day  aMIOdarone    Tablet 200 milliGRAM(s) Oral daily  dextrose 40% Gel 15 Gram(s) Oral once  dextrose 5%. 1000 milliLiter(s) (50 mL/Hr) IV Continuous <Continuous>  dextrose 5%. 1000 milliLiter(s) (100 mL/Hr) IV Continuous <Continuous>  dextrose 50% Injectable 25 Gram(s) IV Push once  dextrose 50% Injectable 12.5 Gram(s) IV Push once  dextrose 50% Injectable 25 Gram(s) IV Push once  doxazosin 2 milliGRAM(s) Oral at bedtime  glucagon  Injectable 1 milliGRAM(s) IntraMuscular once  heparin  Infusion 1000 Unit(s)/Hr (10 mL/Hr) IV Continuous <Continuous>  insulin lispro (ADMELOG) corrective regimen sliding scale   SubCutaneous Before meals and at bedtime  metoprolol tartrate 25 milliGRAM(s) Oral every 6 hours  milrinone Infusion 0.25 MICROgram(s)/kG/Min (5.42 mL/Hr) IV Continuous <Continuous>  pantoprazole    Tablet 40 milliGRAM(s) Oral before breakfast  polyethylene glycol 3350 17 Gram(s) Oral daily  sodium bicarbonate 650 milliGRAM(s) Oral two times a day  sodium chloride 0.9%. 1000 milliLiter(s) (10 mL/Hr) IV Continuous <Continuous>    MEDICATIONS  (PRN):  acetaminophen   Tablet .. 650 milliGRAM(s) Oral every 6 hours PRN Mild Pain (1 - 3)  oxycodone    5 mG/acetaminophen 325 mG 1 Tablet(s) Oral every 6 hours PRN Moderate Pain (4 - 6)    Allergies    No Known Allergies    Intolerances      FAMILY HISTORY:  No pertinent family history in first degree relatives        T(C): 36.5 (01-25-21 @ 12:00), Max: 37.1 (01-24-21 @ 14:00)  HR: 66 (01-25-21 @ 13:00) (66 - 90)  BP: 159/73 (01-24-21 @ 21:00) (159/73 - 159/73)  RR: 20 (01-25-21 @ 12:00) (16 - 28)  SpO2: 100% (01-25-21 @ 13:00) (91% - 100%)    GENERAL: NAD, Resting comfortably in chair  HEENT: NCAT, MMM, Normal conjunctiva, PERRL  RESP: Nonlabored breathing, No respiratory distress  CARD: Normal rate, Normal peripheral perfusion  EXTREM: WWP, 1+ pedal edema, LUE with AVF with great thrill, tortuous vasculature, 2+ radial and ulnar pulses bilaterally  SKIN: No rashes, no lesions  NEURO: AAOx3, No focal motor or sensory deficits  PSYCH: Pleasant    LABS:                        8.6    8.58  )-----------( 185      ( 25 Jan 2021 02:41 )             28.2     01-25    140  |  104  |  40<H>  ----------------------------<  81  3.8   |  22  |  5.61<H>    Ca    8.3<L>      25 Jan 2021 03:11  Mg     2.0     01-25      PT/INR - ( 25 Jan 2021 02:41 )   PT: 15.5 sec;   INR: 1.31          PTT - ( 25 Jan 2021 02:41 )  PTT:40.2 sec    Assessment:   62 y/o M with a PMHx of HTN, COPD, CKD (stage 4 CKD, has LUE AVF, not on HD yet), T-cell lymphoma, AF, systolic CHF, severe pHTN who is S/p  VATS for loculated pleural effusion 1/22. Vascular consulted for evaluation of radiocephalic fistula which was created on 6/12/20 and has not yet been used. Sufficient time has elapsed to allow for maturation and there is a strong thrill.     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 On 1/18/21 patient presented to [facility] for pre-operative optimization with heparin gtt prior to OR 1/19 for VATS.     History as above, Pt is now POD3 s/p VATS decortication of loculated pleural effusion, pleurodesis. Postoperatively with fluid overload. Vascular surgery consulted for evaluation of AVF for possible HD. Pt has a radiocephalic fistula that was placed on 6/12/20, however has not needed to initiate HD yet. Per nephrology no indication for HD at this time. Pt denies any pain, weakness or abnormal sensation in hand or arm. Currently, patient states that he feels well overall. Denies SOB, pain largely controlled except for tugging of central line in neck, no CP, abd pain. OOB to chair.     PAST MEDICAL & SURGICAL HISTORY:  BPH (benign prostatic hyperplasia)    Gout    H/O pulmonary hypertension    CHF, chronic    Lymphoma  t cell; Chemotherapy weekly- wednesday    CKD (chronic kidney disease)    Atrial fibrillation    HTN (hypertension)    Elective surgery  rectal surgery    History of cholecystectomy      MEDICATIONS  (STANDING):  allopurinol 200 milliGRAM(s) Oral two times a day  aMIOdarone    Tablet 200 milliGRAM(s) Oral daily  dextrose 40% Gel 15 Gram(s) Oral once  dextrose 5%. 1000 milliLiter(s) (50 mL/Hr) IV Continuous <Continuous>  dextrose 5%. 1000 milliLiter(s) (100 mL/Hr) IV Continuous <Continuous>  dextrose 50% Injectable 25 Gram(s) IV Push once  dextrose 50% Injectable 12.5 Gram(s) IV Push once  dextrose 50% Injectable 25 Gram(s) IV Push once  doxazosin 2 milliGRAM(s) Oral at bedtime  glucagon  Injectable 1 milliGRAM(s) IntraMuscular once  heparin  Infusion 1000 Unit(s)/Hr (10 mL/Hr) IV Continuous <Continuous>  insulin lispro (ADMELOG) corrective regimen sliding scale   SubCutaneous Before meals and at bedtime  metoprolol tartrate 25 milliGRAM(s) Oral every 6 hours  milrinone Infusion 0.25 MICROgram(s)/kG/Min (5.42 mL/Hr) IV Continuous <Continuous>  pantoprazole    Tablet 40 milliGRAM(s) Oral before breakfast  polyethylene glycol 3350 17 Gram(s) Oral daily  sodium bicarbonate 650 milliGRAM(s) Oral two times a day  sodium chloride 0.9%. 1000 milliLiter(s) (10 mL/Hr) IV Continuous <Continuous>    MEDICATIONS  (PRN):  acetaminophen   Tablet .. 650 milliGRAM(s) Oral every 6 hours PRN Mild Pain (1 - 3)  oxycodone    5 mG/acetaminophen 325 mG 1 Tablet(s) Oral every 6 hours PRN Moderate Pain (4 - 6)    Allergies    No Known Allergies    Intolerances      FAMILY HISTORY:  No pertinent family history in first degree relatives        T(C): 36.5 (01-25-21 @ 12:00), Max: 37.1 (01-24-21 @ 14:00)  HR: 66 (01-25-21 @ 13:00) (66 - 90)  BP: 159/73 (01-24-21 @ 21:00) (159/73 - 159/73)  RR: 20 (01-25-21 @ 12:00) (16 - 28)  SpO2: 100% (01-25-21 @ 13:00) (91% - 100%)    GENERAL: NAD, Resting comfortably in chair  HEENT: NCAT, MMM, Normal conjunctiva, PERRL  RESP: Nonlabored breathing, No respiratory distress  CARD: Normal rate, Normal peripheral perfusion  EXTREM: WWP, 1+ pedal edema, LUE with AVF with great thrill, tortuous vasculature, 2+ radial and ulnar pulses bilaterally  SKIN: No rashes, no lesions  NEURO: AAOx3, No focal motor or sensory deficits  PSYCH: Pleasant    LABS:                        8.6    8.58  )-----------( 185      ( 25 Jan 2021 02:41 )             28.2     01-25    140  |  104  |  40<H>  ----------------------------<  81  3.8   |  22  |  5.61<H>    Ca    8.3<L>      25 Jan 2021 03:11  Mg     2.0     01-25      PT/INR - ( 25 Jan 2021 02:41 )   PT: 15.5 sec;   INR: 1.31          PTT - ( 25 Jan 2021 02:41 )  PTT:40.2 sec    Assessment:   64 y/o M with a PMHx of HTN, COPD, CKD (stage 4 CKD, has LUE AVF, not on HD yet), T-cell lymphoma, AF, systolic CHF, severe pHTN who is S/p  VATS for loculated pleural effusion 1/22. Vascular consulted for evaluation of radiocephalic fistula which was created on 6/12/20 and has not yet been used. Sufficient time has elapsed to allow for maturation and there is a strong thrill, and recent outpatient ultrasound demonstrates adequate size.     Recommendations:  - AVF is patent and ready to use for dialysis  - HD indications per nephrology  - Discussed with chief on call, attending surgeon

## 2023-10-06 NOTE — PROGRESS NOTE ADULT - SUBJECTIVE AND OBJECTIVE BOX
Heme/Onc Progress note    INTERVAL HPI/OVERNIGHT EVENTS:  Patient S&E at bedside. No o/n events, patient resting comfortably. No complaints at this time. Patient denies fever, chills, dizziness, weakness, CP, palpitations, SOB, cough, N/V/D/C, dysuria, changes in bowel movements, LE edema.    VITAL SIGNS:  T(F): 98.5 (10-06-23 @ 04:55)  HR: 94 (10-06-23 @ 04:55)  BP: 119/83 (10-06-23 @ 04:55)  RR: 18 (10-06-23 @ 04:55)  SpO2: 94% (10-06-23 @ 04:55)  Wt(kg): --    PHYSICAL EXAM:    Constitutional: NAD  Eyes: EOMI, sclera non-icteric  Neck: supple, no masses, no JVD  Respiratory: CTA b/l, good air entry b/l  Cardiovascular: RRR, no M/R/G  Gastrointestinal: soft, NTND, no masses palpable, + BS,   Extremities: no c/c/e  Neurological: AAOx3      MEDICATIONS  (STANDING):  apixaban 10 milliGRAM(s) Oral every 12 hours  aspirin enteric coated 81 milliGRAM(s) Oral daily  dextrose 5%. 1000 milliLiter(s) (50 mL/Hr) IV Continuous <Continuous>  dextrose 5%. 1000 milliLiter(s) (100 mL/Hr) IV Continuous <Continuous>  dextrose 50% Injectable 12.5 Gram(s) IV Push once  dextrose 50% Injectable 25 Gram(s) IV Push once  dextrose 50% Injectable 25 Gram(s) IV Push once  glucagon  Injectable 1 milliGRAM(s) IntraMuscular once  influenza   Vaccine 0.5 milliLiter(s) IntraMuscular once  insulin glargine Injectable (LANTUS) 15 Unit(s) SubCutaneous at bedtime  insulin lispro (ADMELOG) corrective regimen sliding scale   SubCutaneous three times a day before meals  insulin lispro (ADMELOG) corrective regimen sliding scale   SubCutaneous at bedtime  metoprolol succinate ER 50 milliGRAM(s) Oral daily  simvastatin 20 milliGRAM(s) Oral at bedtime    MEDICATIONS  (PRN):  acetaminophen     Tablet .. 650 milliGRAM(s) Oral every 6 hours PRN Mild Pain (1 - 3), Moderate Pain (4 - 6)  dextrose Oral Gel 15 Gram(s) Oral once PRN Blood Glucose LESS THAN 70 milliGRAM(s)/deciliter  melatonin 3 milliGRAM(s) Oral at bedtime PRN Insomnia      Allergies    No Known Allergies    Intolerances    lactose (Diarrhea)      LABS:                        9.9    6.33  )-----------( 290      ( 05 Oct 2023 08:03 )             30.4     10-05    134<L>  |  104  |  25<H>  ----------------------------<  194<H>  4.3   |  25  |  1.22    Ca    8.4<L>      05 Oct 2023 08:03        Urinalysis Basic - ( 05 Oct 2023 08:03 )    Color: x / Appearance: x / SG: x / pH: x  Gluc: 194 mg/dL / Ketone: x  / Bili: x / Urobili: x   Blood: x / Protein: x / Nitrite: x   Leuk Esterase: x / RBC: x / WBC x   Sq Epi: x / Non Sq Epi: x / Bacteria: x        RADIOLOGY & ADDITIONAL TESTS:  Studies reviewed.    ASSESSMENT & PLAN:

## 2023-10-06 NOTE — PROGRESS NOTE ADULT - REASON FOR ADMISSION
syncope, abnormal CT head, elevated trop

## 2023-10-06 NOTE — DISCHARGE NOTE NURSING/CASE MANAGEMENT/SOCIAL WORK - NSDCVIVACCINE_GEN_ALL_CORE_FT
No Vaccines Administered. influenza, injectable, quadrivalent, preservative free; 06-Oct-2023 18:13; Kandi Knox (JAYY); [employer]; [id number] (Exp. Date: 20-Jun-2024); IntraMuscular; Deltoid Left.; 0.5 milliLiter(s); VIS (VIS Published: 06-Aug-2021, VIS Presented: 06-Oct-2023);

## 2023-10-06 NOTE — DISCHARGE NOTE PROVIDER - NSFOLLOWUPCLINICS_GEN_ALL_ED_FT
[facility]  Endocrinology  [address] [place] [address]  Phone: [phone]  Fax:   Follow Up Time: 1 week    [facility]  Podiatry  [address] [place] [address]  Phone: [phone]  Fax:   Follow Up Time: 1 month     [facility]  Endocrinology  [address] [place] [address]  Phone: [phone]  Fax:   Follow Up Time: 1 week    [facility]  Podiatry  [address] [place] [address]  Phone: [phone]  Fax:   Scheduled Appointment: 10/31/2023 2:00 PM

## 2023-10-06 NOTE — DISCHARGE NOTE PROVIDER - NSDCCPCAREPLAN_GEN_ALL_CORE_FT
PRINCIPAL DISCHARGE DIAGNOSIS  Diagnosis: Syncope  Assessment and Plan of Treatment: Syncope refers to a condition in which a person temporarily loses consciousness. Syncope may also be called fainting or passing out. It is caused by a sudden decrease in blood flow to the brain. Even though most causes of syncope are not dangerous, syncope can be a sign of a serious medical problem. Your health care provider may do tests to find the reason why you are having syncope.  Signs that you may be about to faint include:  Feeling dizzy or light-headed.Feeling nauseous. Seeing all white or all black in your field of vision. Having cold, clammy skin.   If you faint, get medical help right away. Call your local emergency services (451 in the [place] Do not drive yourself to the hospital.  Follow these instructions at home:  Pay attention to any changes in your symptoms.   Take these actions to stay safe and to help relieve your symptoms:  Do not drive, use machinery, or play sports until your health care provider says it is okay. Do not drink alcohol. Do not use any products that contain nicotine or tobacco, such as cigarettes and e-cigarettes. If you need help quitting, ask your health care provider. Drink enough fluid to keep your urine pale yellow.  Get help right away if you:  Have a severe headache. Faint once or repeatedly. Have pain in your chest, abdomen, or back. Have a very fast or irregular heartbeat (palpitations). Have pain when you breathe. Are bleeding from your mouth or rectum, or you have black or tarry stool. Have a seizure. Are confused. Have trouble walking. Have severe weakness. Have vision problems. These symptoms may represent a serious problem that is an emergency. Do not wait to see if your symptoms will go away. Get medical help right away. Call your local emergency services (541 in the [place] Do not drive yourself to the hospital.         SECONDARY DISCHARGE DIAGNOSES  Diagnosis: Benign essential HTN  Assessment and Plan of Treatment: .Continue prescribed medications to control your cholesterol levels and a DASH (Low fat/salt) diet. Follow up with your primary care provider upon discharge for further management and monitoring of cholesterol levels.      Diagnosis: CAD (coronary artery disease)  Assessment and Plan of Treatment: Follow-up with your primary provider/cardiologist as outpatient for ongoing care. If you have persistent chest pain, shortness of breath, heart palpitations, or dizziness you should return to the emergency room.      Diagnosis: Type 2 diabetes mellitus with hyperglycemia  Assessment and Plan of Treatment: .Your HgA1C during hospitalization was noted to be 12.3% (Provide such information to your primary care).  Continue your medication regimen and a consistent carbohydrate diet (Meaning eating the same amount of carbohydrates at the same time each day). Monitor blood glucose levels throughout the day before meals and at bedtime. Record blood sugars and bring to outpatient providers appointment in order to be reviewed by your doctor for management modifications. If your sugars are more than 400 or less than 70 you should contact your PCP immediately.   Monitor for signs/symptoms of low blood glucose, such as, dizziness, altered mental status, or cool/clammy skin. In addition, monitor for signs/symptoms of high blood glucose, such as, feeling hot, dry, fatigued, or with increased thirst/urination.   Make regular podiatry appointments in order to have feet checked for wounds and uncontrolled toe nail growth to prevent infections, as well as, appointments with an ophthalmologist to monitor your vision.      Diagnosis: Hyperlipidemia, unspecified  Assessment and Plan of Treatment: .Continue prescribed medications to control your cholesterol levels and a DASH (Low fat/salt) diet. Follow up with your primary care provider upon discharge for further management and monitoring of cholesterol levels.      Diagnosis: Elevated troponin  Assessment and Plan of Treatment: resolved   Follow up with your primary care provider upon discharge for further management     PRINCIPAL DISCHARGE DIAGNOSIS  Diagnosis: Syncope  Assessment and Plan of Treatment: Syncope refers to a condition in which a person temporarily loses consciousness. Syncope may also be called fainting or passing out. It is caused by a sudden decrease in blood flow to the brain. Even though most causes of syncope are not dangerous, syncope can be a sign of a serious medical problem. Your health care provider may do tests to find the reason why you are having syncope.  Signs that you may be about to faint include:  Feeling dizzy or light-headed.Feeling nauseous. Seeing all white or all black in your field of vision. Having cold, clammy skin.   If you faint, get medical help right away. Call your local emergency services (011 in the [place] Do not drive yourself to the hospital.  Follow these instructions at home:  Pay attention to any changes in your symptoms.   Take these actions to stay safe and to help relieve your symptoms:  Do not drive, use machinery, or play sports until your health care provider says it is okay. Do not drink alcohol. Do not use any products that contain nicotine or tobacco, such as cigarettes and e-cigarettes. If you need help quitting, ask your health care provider. Drink enough fluid to keep your urine pale yellow.  Get help right away if you:  Have a severe headache. Faint once or repeatedly. Have pain in your chest, abdomen, or back. Have a very fast or irregular heartbeat (palpitations). Have pain when you breathe. Are bleeding from your mouth or rectum, or you have black or tarry stool. Have a seizure. Are confused. Have trouble walking. Have severe weakness. Have vision problems. These symptoms may represent a serious problem that is an emergency. Do not wait to see if your symptoms will go away. Get medical help right away. Call your local emergency services (847 in the [place] Do not drive yourself to the hospital.         SECONDARY DISCHARGE DIAGNOSES  Diagnosis: Pulmonary embolism  Assessment and Plan of Treatment: A pulmonary embolism occurs when a blood clot gets stuck in the blood vessels in your lungs. A pulmonary embolism can reduce the amount of oxygen getting in to your body. A lack of oxygen can damage your lungs and other organs.  The symptoms of pulmonary embolism include:  sudden or new breathlessness  chest pain, which may be worse with deep breaths  fast heart rate  fast breathing  cough, or coughing up blood  fainting or dizziness  If you are having the symptoms above, go to your nearest emergency department or call [phone] for an ambulance.  Other symptoms that you may have with a pulmonary embolism include:  swelling or pain in your calf  pain in your back  clammy skin  fever and sweating  feeling lightheaded  You are more likely to get a pulmonary embolism if you:  have a genetic (inherited) condition that causes clotting problems  have had major surgery, for example a hip or knee replacement  have been badly injured, such as a spinal injury  have a severe illness such as cancer or kidney failure  are taking certain medicines  are pregnant or have recently had a baby  have had a pulmonary embolism or a DVT before  Some other risk factors for a DVT or pulmonary embolism include:  being immobile (not moving) such as if you are recovering from illness, or travelling on long-haul flights  older age  obesity  smoking  After a pulmonary embolism you might have shortness of breath as well as mild chest pain or pressure. This could happen when you exercise or breathe deeply. Your symptoms will improve over time. Follow your doctor’s instructions about how much to exercise.  You may need to continue to take blood thinning medicine for several months or more. Follow up with Hematology as advised.    Diagnosis: Benign essential HTN  Assessment and Plan of Treatment: .Continue prescribed medications to control your cholesterol levels and a DASH (Low fat/salt) diet. Follow up with your primary care provider upon discharge for further management and monitoring of cholesterol levels.      Diagnosis: CAD (coronary artery disease)  Assessment and Plan of Treatment: Follow-up with your primary provider/cardiologist as outpatient for ongoing care. If you have persistent chest pain, shortness of breath, heart palpitations, or dizziness you should return to the emergency room.      Diagnosis: Type 2 diabetes mellitus with hyperglycemia  Assessment and Plan of Treatment: .Your HgA1C during hospitalization was noted to be 12.3% (Provide such information to your primary care).  Follow up with Endocrinology 10/31/23 at 2pm.   Continue your medication regimen and a consistent carbohydrate diet (Meaning eating the same amount of carbohydrates at the same time each day). Monitor blood glucose levels throughout the day before meals and at bedtime. Record blood sugars and bring to outpatient providers appointment in order to be reviewed by your doctor for management modifications. If your sugars are more than 400 or less than 70 you should contact your PCP immediately.   Monitor for signs/symptoms of low blood glucose, such as, dizziness, altered mental status, or cool/clammy skin. In addition, monitor for signs/symptoms of high blood glucose, such as, feeling hot, dry, fatigued, or with increased thirst/urination.   Make regular podiatry appointments in order to have feet checked for wounds and uncontrolled toe nail growth to prevent infections, as well as, appointments with an ophthalmologist to monitor your vision.      Diagnosis: Hyperlipidemia, unspecified  Assessment and Plan of Treatment: .Continue prescribed medications to control your cholesterol levels and a DASH (Low fat/salt) diet. Follow up with your primary care provider upon discharge for further management and monitoring of cholesterol levels.      Diagnosis: Elevated troponin  Assessment and Plan of Treatment: resolved   Follow up with your primary care provider upon discharge for further management

## 2023-10-06 NOTE — DISCHARGE NOTE PROVIDER - NSDCMRMEDTOKEN_GEN_ALL_CORE_FT
aspirin 81 mg oral delayed release tablet: 1 tab(s) orally once a day  lisinopril 40 mg oral tablet: 1 tab(s) orally once a day  metFORMIN 1000 mg oral tablet: 1 tab(s) orally 2 times a day  metoprolol succinate 50 mg oral tablet, extended release: 1 tab(s) orally once a day  simvastatin 20 mg oral tablet: 1 tab(s) orally once a day (at bedtime)   aspirin 81 mg oral delayed release tablet: 1 tab(s) orally once a day  Eliquis Starter Pack for Treatment of DVT and PE 5 mg oral tablet: 2 tab(s) orally every 12 hours then decrease to 5 mg twice daily for remaining 30 days  ferrous sulfate 324 mg (65 mg elemental iron) oral delayed release tablet: 1 tab(s) orally once a day  lisinopril 40 mg oral tablet: 1 tab(s) orally once a day  metFORMIN 1000 mg oral tablet: 1 tab(s) orally 2 times a day  metoprolol succinate 50 mg oral tablet, extended release: 1 tab(s) orally once a day  simvastatin 20 mg oral tablet: 1 tab(s) orally once a day (at bedtime)   Admelog SoloStar 100 units/mL injectable solution: 3 unit(s) subcutaneous 3 times a day (with meals)  alcohol swabs: Apply topically to affected area 4 times a day  aspirin 81 mg oral delayed release tablet: 1 tab(s) orally once a day  Eliquis Starter Pack for Treatment of DVT and PE 5 mg oral tablet: 2 tab(s) orally every 12 hours then decrease to 5 mg twice daily for remaining 30 days  ferrous sulfate 324 mg (65 mg elemental iron) oral delayed release tablet: 1 tab(s) orally once a day  glucometer (per patient&#x27;s insurance): Test blood sugars four times a day. Dispense #1 glucometer.  glucose tablets: Follow instructions on bottle when sugar is low.  insulin glargine 100 units/mL subcutaneous solution: 15 unit(s) subcutaneous once a day (at bedtime)  Insulin Pen Needles, 4mm: 1 application subcutaneously 4 times a day. ** Use with insulin pen **  lancets: 1 application subcutaneously 4 times a day  lisinopril 40 mg oral tablet: 1 tab(s) orally once a day  metoprolol succinate 50 mg oral tablet, extended release: 1 tab(s) orally once a day  simvastatin 20 mg oral tablet: 1 tab(s) orally once a day (at bedtime)  test strips (per patient&#x27;s insurance): 1 application subcutaneously 4 times a day. ** Compatible with patient&#x27;s glucometer **   Admelog SoloStar 100 units/mL injectable solution: 3 unit(s) subcutaneous 3 times a day (with meals)  alcohol swabs: Apply topically to affected area 4 times a day  aspirin 81 mg oral delayed release tablet: 1 tab(s) orally once a day  Eliquis Starter Pack for Treatment of DVT and PE 5 mg oral tablet: 2 tab(s) orally every 12 hours then decrease to 5 mg twice daily for remaining 30 days  ferrous sulfate 324 mg (65 mg elemental iron) oral delayed release tablet: 1 tab(s) orally once a day  glucometer (per patient&#x27;s insurance): Test blood sugars four times a day. Dispense #1 glucometer.  glucose tablets: Follow instructions on bottle when sugar is low.  Insulin Pen Needles, 4mm: 1 application subcutaneously 4 times a day. ** Use with insulin pen **  lancets: 1 application subcutaneously 4 times a day  Lantus Solostar Pen 100 units/mL subcutaneous solution: 15 unit(s) subcutaneous once a day (at bedtime)  lisinopril 40 mg oral tablet: 1 tab(s) orally once a day  metoprolol succinate 50 mg oral tablet, extended release: 1 tab(s) orally once a day  simvastatin 20 mg oral tablet: 1 tab(s) orally once a day (at bedtime)  test strips (per patient&#x27;s insurance): 1 application subcutaneously 4 times a day. ** Compatible with patient&#x27;s glucometer **

## 2023-10-06 NOTE — PROGRESS NOTE ADULT - NS ATTEND AMEND GEN_ALL_CORE FT
# PE:   patient w/ hx of provoked DVT in 2007 r/t birth control as per patient was on coumadin x6 months and then discontinued by hematologist   -Malignancy w/u as above   -APLS w/u negative   malignancy w/u was neg   on   DOAC , pt was advised to cont  AC lifelong       # malignancy w/u neg as above  SPEp neg for monoclonal gammopathy, pt had increased IGG level can f/u as an outpt     # ANEMIA  tx with IV iron  x 1  can cont IV iron PRN as an outpt   call with questions [phone]   f/u with us as an outpt in a few weeks   f/u  with pcp next week
# PE:   patient w/ hx of provoked DVT in 2007 r/t birth control as per patient was on coumadin x6 months and then discontinued by hematologist   -Malignancy w/u as above   -APLS w/u negative   changed to  DOAC , pt was advised to cont  AC lifelong   d/w pt DOAC tx does not require frequent blood monitoring or dietary adjustments  malignancy w/u was neg   # malignancy w/u neg as above  SPEp neg for monoclonal gammopathy, pt had increased IGG level can f/u as an outpt   call with questions [phone]   f/u with us as an outpt in a few weeks   f/u  with pcp next week
# PE:   patient w/ hx of provoked DVT in 2007 r/t birth control as per patient was on coumadin x6 months and then discontinued by hematologist   -Malignancy w/u as above   -On Lovenox; change to DOAC if no intervention needed   -APLS w/u in progress    -Further hypercoag w/u can be done as out-pt   Duration of AC: life long ( 2nd episode )  d/w pt DOAC tx does not require frequent blood monitoring or dietary adjustments

## 2023-10-06 NOTE — PROGRESS NOTE ADULT - PROVIDER SPECIALTY LIST ADULT
Heme/Onc
Pulmonology
Hospitalist
Hospitalist
Nephrology
Nephrology
Heme/Onc
Heme/Onc
Hospitalist
Hospitalist

## 2023-10-06 NOTE — DISCHARGE NOTE NURSING/CASE MANAGEMENT/SOCIAL WORK - PATIENT PORTAL LINK FT
You can access the FollowMyHealth Patient Portal offered by [facility] by registering at the following website: [email or web address]. By joining [facility]’s FollowMyHealth portal, you will also be able to view your health information using other applications (apps) compatible with our system.

## 2023-10-06 NOTE — DISCHARGE NOTE PROVIDER - CARE PROVIDER_API CALL
Suzette Ibrahim  [facility]  [address], [address]  [place] [address]  Phone: [phone]  Fax: [phone]  Scheduled Appointment: 11/02/2023 09:30 AM

## 2023-10-07 LAB
% GAMMA, URINE: 10.8 % — SIGNIFICANT CHANGE UP
ALBUMIN 24H MFR UR ELPH: 35.9 % — SIGNIFICANT CHANGE UP
ALPHA1 GLOB 24H MFR UR ELPH: 23.7 % — SIGNIFICANT CHANGE UP
ALPHA2 GLOB 24H MFR UR ELPH: 14.3 % — SIGNIFICANT CHANGE UP
B-GLOBULIN 24H MFR UR ELPH: 15.3 % — SIGNIFICANT CHANGE UP
INTERPRETATION 24H UR IFE-IMP: SIGNIFICANT CHANGE UP
M PROTEIN 24H UR ELPH-MRATE: SIGNIFICANT CHANGE UP
PROT ?TM UR-MCNC: 36 MG/DL — HIGH (ref 0–12)
PROT PATTERN 24H UR ELPH-IMP: SIGNIFICANT CHANGE UP
TOTAL VOLUME - 24 HOUR: SIGNIFICANT CHANGE UP ML
URINE CREATININE CALCULATION: SIGNIFICANT CHANGE UP G/24 H (ref 0.8–1.8)

## 2023-10-10 PROBLEM — Z00.00 ENCOUNTER FOR PREVENTIVE HEALTH EXAMINATION: Status: ACTIVE | Noted: 2023-10-10

## 2023-10-13 NOTE — CHART NOTE - NSCHARTNOTEFT_GEN_A_CORE
To whom it may concern:     MAYI CAMP has been under inpatient care at [facility] since 10/2/23. She was discharged from the hospital 10/6/23 and may resume all activities without  restrictions as of 10/9/23.       If there are any questions please do not hesitate to call.    Mela Gomes NP-BC  on behalf of Dr. Crook  [phone]
Addendum:  Syncope was likely due to pulmonary embolus and was treated with Eliquis.

## 2023-10-16 DIAGNOSIS — Z86.718 PERSONAL HISTORY OF OTHER VENOUS THROMBOSIS AND EMBOLISM: ICD-10-CM

## 2023-10-16 DIAGNOSIS — E87.5 HYPERKALEMIA: ICD-10-CM

## 2023-10-16 DIAGNOSIS — Z91.81 HISTORY OF FALLING: ICD-10-CM

## 2023-10-16 DIAGNOSIS — I10 ESSENTIAL (PRIMARY) HYPERTENSION: ICD-10-CM

## 2023-10-16 DIAGNOSIS — D50.9 IRON DEFICIENCY ANEMIA, UNSPECIFIED: ICD-10-CM

## 2023-10-16 DIAGNOSIS — E11.65 TYPE 2 DIABETES MELLITUS WITH HYPERGLYCEMIA: ICD-10-CM

## 2023-10-16 DIAGNOSIS — R55 SYNCOPE AND COLLAPSE: ICD-10-CM

## 2023-10-16 DIAGNOSIS — D64.9 ANEMIA, UNSPECIFIED: ICD-10-CM

## 2023-10-16 DIAGNOSIS — R93.0 ABNORMAL FINDINGS ON DIAGNOSTIC IMAGING OF SKULL AND HEAD, NOT ELSEWHERE CLASSIFIED: ICD-10-CM

## 2023-10-16 DIAGNOSIS — E78.5 HYPERLIPIDEMIA, UNSPECIFIED: ICD-10-CM

## 2023-10-16 DIAGNOSIS — K76.0 FATTY (CHANGE OF) LIVER, NOT ELSEWHERE CLASSIFIED: ICD-10-CM

## 2023-10-16 DIAGNOSIS — N17.9 ACUTE KIDNEY FAILURE, UNSPECIFIED: ICD-10-CM

## 2023-10-16 DIAGNOSIS — I25.10 ATHEROSCLEROTIC HEART DISEASE OF NATIVE CORONARY ARTERY WITHOUT ANGINA PECTORIS: ICD-10-CM

## 2023-10-16 DIAGNOSIS — Z79.84 LONG TERM (CURRENT) USE OF ORAL HYPOGLYCEMIC DRUGS: ICD-10-CM

## 2023-10-16 DIAGNOSIS — R79.89 OTHER SPECIFIED ABNORMAL FINDINGS OF BLOOD CHEMISTRY: ICD-10-CM

## 2023-10-16 DIAGNOSIS — I26.99 OTHER PULMONARY EMBOLISM WITHOUT ACUTE COR PULMONALE: ICD-10-CM

## 2023-10-16 DIAGNOSIS — E66.9 OBESITY, UNSPECIFIED: ICD-10-CM

## 2023-10-29 NOTE — PROGRESS NOTE ADULT - PROBLEM SELECTOR PROBLEM 8
History of DVT (deep vein thrombosis)
History of DVT (deep vein thrombosis)
Normal muscle tone/strength

## 2023-10-31 ENCOUNTER — APPOINTMENT (OUTPATIENT)
Dept: ENDOCRINOLOGY | Facility: CLINIC | Age: 43
End: 2023-10-31

## 2023-11-21 ENCOUNTER — TRANSCRIPTION ENCOUNTER (OUTPATIENT)
Age: 43
End: 2023-11-21

## 2023-11-21 ENCOUNTER — APPOINTMENT (OUTPATIENT)
Dept: NUCLEAR MEDICINE | Facility: CLINIC | Age: 43
End: 2023-11-21
Payer: COMMERCIAL

## 2023-11-21 PROCEDURE — A9552: CPT

## 2023-11-21 PROCEDURE — 78815 PET IMAGE W/CT SKULL-THIGH: CPT | Mod: PI

## 2024-01-02 ENCOUNTER — APPOINTMENT (OUTPATIENT)
Dept: MRI IMAGING | Facility: CLINIC | Age: 44
End: 2024-01-02
Payer: COMMERCIAL

## 2024-01-02 PROCEDURE — 74183 MRI ABD W/O CNTR FLWD CNTR: CPT

## 2024-01-02 PROCEDURE — 72197 MRI PELVIS W/O & W/DYE: CPT

## 2024-01-02 PROCEDURE — A9585: CPT | Mod: NC

## 2025-07-13 ENCOUNTER — EMERGENCY (EMERGENCY)
Facility: HOSPITAL | Age: 45
LOS: 0 days | Discharge: ANOTHER HOSPITAL | End: 2025-07-14
Attending: STUDENT IN AN ORGANIZED HEALTH CARE EDUCATION/TRAINING PROGRAM
Payer: SELF-PAY

## 2025-07-13 VITALS
SYSTOLIC BLOOD PRESSURE: 106 MMHG | DIASTOLIC BLOOD PRESSURE: 76 MMHG | TEMPERATURE: 98 F | HEART RATE: 120 BPM | WEIGHT: 225.97 LBS | OXYGEN SATURATION: 96 % | HEIGHT: 70 IN | RESPIRATION RATE: 18 BRPM

## 2025-07-13 DIAGNOSIS — T45.516A UNDERDOSING OF ANTICOAGULANTS, INITIAL ENCOUNTER: ICD-10-CM

## 2025-07-13 DIAGNOSIS — I25.2 OLD MYOCARDIAL INFARCTION: ICD-10-CM

## 2025-07-13 DIAGNOSIS — I25.10 ATHEROSCLEROTIC HEART DISEASE OF NATIVE CORONARY ARTERY WITHOUT ANGINA PECTORIS: ICD-10-CM

## 2025-07-13 DIAGNOSIS — E73.9 LACTOSE INTOLERANCE, UNSPECIFIED: ICD-10-CM

## 2025-07-13 DIAGNOSIS — I26.99 OTHER PULMONARY EMBOLISM WITHOUT ACUTE COR PULMONALE: ICD-10-CM

## 2025-07-13 DIAGNOSIS — I51.9 HEART DISEASE, UNSPECIFIED: ICD-10-CM

## 2025-07-13 DIAGNOSIS — Z91.138 PATIENT'S UNINTENTIONAL UNDERDOSING OF MEDICATION REGIMEN FOR OTHER REASON: ICD-10-CM

## 2025-07-13 DIAGNOSIS — I45.10 UNSPECIFIED RIGHT BUNDLE-BRANCH BLOCK: ICD-10-CM

## 2025-07-13 PROCEDURE — 99291 CRITICAL CARE FIRST HOUR: CPT

## 2025-07-13 NOTE — ED ADULT TRIAGE NOTE - CHIEF COMPLAINT QUOTE
syncope x 4 minutes , become cyanotic lower lip laceration  hx DM, HTN , cardiac , MI 2015 Ran out Eliquis 2.5mg a months ago it was taking for PE , lost her insurance

## 2025-07-14 ENCOUNTER — INPATIENT (INPATIENT)
Facility: HOSPITAL | Age: 45
LOS: 6 days | Discharge: HOME | DRG: 175 | End: 2025-07-21
Attending: STUDENT IN AN ORGANIZED HEALTH CARE EDUCATION/TRAINING PROGRAM | Admitting: HOSPITALIST
Payer: COMMERCIAL

## 2025-07-14 ENCOUNTER — TRANSCRIPTION ENCOUNTER (OUTPATIENT)
Age: 45
End: 2025-07-14

## 2025-07-14 ENCOUNTER — RESULT REVIEW (OUTPATIENT)
Age: 45
End: 2025-07-14

## 2025-07-14 VITALS
RESPIRATION RATE: 19 BRPM | TEMPERATURE: 98 F | SYSTOLIC BLOOD PRESSURE: 128 MMHG | HEART RATE: 118 BPM | OXYGEN SATURATION: 96 % | DIASTOLIC BLOOD PRESSURE: 88 MMHG

## 2025-07-14 VITALS
RESPIRATION RATE: 22 BRPM | DIASTOLIC BLOOD PRESSURE: 67 MMHG | TEMPERATURE: 98 F | SYSTOLIC BLOOD PRESSURE: 107 MMHG | HEART RATE: 107 BPM | WEIGHT: 225.09 LBS | OXYGEN SATURATION: 95 %

## 2025-07-14 DIAGNOSIS — I26.99 OTHER PULMONARY EMBOLISM WITHOUT ACUTE COR PULMONALE: ICD-10-CM

## 2025-07-14 DIAGNOSIS — R79.89 OTHER SPECIFIED ABNORMAL FINDINGS OF BLOOD CHEMISTRY: ICD-10-CM

## 2025-07-14 DIAGNOSIS — Z29.9 ENCOUNTER FOR PROPHYLACTIC MEASURES, UNSPECIFIED: ICD-10-CM

## 2025-07-14 DIAGNOSIS — E78.5 HYPERLIPIDEMIA, UNSPECIFIED: ICD-10-CM

## 2025-07-14 DIAGNOSIS — S09.90XA UNSPECIFIED INJURY OF HEAD, INITIAL ENCOUNTER: ICD-10-CM

## 2025-07-14 DIAGNOSIS — I25.10 ATHEROSCLEROTIC HEART DISEASE OF NATIVE CORONARY ARTERY WITHOUT ANGINA PECTORIS: ICD-10-CM

## 2025-07-14 DIAGNOSIS — E11.9 TYPE 2 DIABETES MELLITUS WITHOUT COMPLICATIONS: ICD-10-CM

## 2025-07-14 DIAGNOSIS — E87.1 HYPO-OSMOLALITY AND HYPONATREMIA: ICD-10-CM

## 2025-07-14 DIAGNOSIS — N17.9 ACUTE KIDNEY FAILURE, UNSPECIFIED: ICD-10-CM

## 2025-07-14 LAB
ALBUMIN SERPL ELPH-MCNC: 3.1 G/DL — LOW (ref 3.3–5)
ALBUMIN SERPL ELPH-MCNC: 4 G/DL — SIGNIFICANT CHANGE UP (ref 3.3–5)
ALP SERPL-CCNC: 62 U/L — SIGNIFICANT CHANGE UP (ref 40–120)
ALP SERPL-CCNC: 67 U/L — SIGNIFICANT CHANGE UP (ref 40–120)
ALT FLD-CCNC: 12 U/L — SIGNIFICANT CHANGE UP (ref 10–45)
ALT FLD-CCNC: 19 U/L — SIGNIFICANT CHANGE UP (ref 12–78)
ANION GAP SERPL CALC-SCNC: 11 MMOL/L — SIGNIFICANT CHANGE UP (ref 5–17)
ANION GAP SERPL CALC-SCNC: 18 MMOL/L — HIGH (ref 5–17)
APTT BLD: 50.4 SEC — HIGH (ref 26.1–36.8)
APTT BLD: 76 SEC — HIGH (ref 26.1–36.8)
APTT BLD: 87.3 SEC — HIGH (ref 26.1–36.8)
AST SERPL-CCNC: 17 U/L — SIGNIFICANT CHANGE UP (ref 10–40)
AST SERPL-CCNC: 41 U/L — HIGH (ref 15–37)
BASE EXCESS BLDV CALC-SCNC: -4.1 MMOL/L — LOW (ref -2–3)
BASOPHILS # BLD AUTO: 0.04 K/UL — SIGNIFICANT CHANGE UP (ref 0–0.2)
BASOPHILS # BLD AUTO: 0.05 K/UL — SIGNIFICANT CHANGE UP (ref 0–0.2)
BASOPHILS NFR BLD AUTO: 0.3 % — SIGNIFICANT CHANGE UP (ref 0–2)
BASOPHILS NFR BLD AUTO: 0.4 % — SIGNIFICANT CHANGE UP (ref 0–2)
BILIRUB SERPL-MCNC: 0.3 MG/DL — SIGNIFICANT CHANGE UP (ref 0.2–1.2)
BILIRUB SERPL-MCNC: 0.4 MG/DL — SIGNIFICANT CHANGE UP (ref 0.2–1.2)
BLD GP AB SCN SERPL QL: NEGATIVE — SIGNIFICANT CHANGE UP
BLOOD GAS COMMENTS, VENOUS: SIGNIFICANT CHANGE UP
BUN SERPL-MCNC: 21 MG/DL — SIGNIFICANT CHANGE UP (ref 7–23)
BUN SERPL-MCNC: 21 MG/DL — SIGNIFICANT CHANGE UP (ref 7–23)
CALCIUM SERPL-MCNC: 10 MG/DL — SIGNIFICANT CHANGE UP (ref 8.4–10.5)
CALCIUM SERPL-MCNC: 9.8 MG/DL — SIGNIFICANT CHANGE UP (ref 8.5–10.1)
CHLORIDE SERPL-SCNC: 93 MMOL/L — LOW (ref 96–108)
CHLORIDE SERPL-SCNC: 93 MMOL/L — LOW (ref 96–108)
CO2 BLDV-SCNC: 23 MMOL/L — SIGNIFICANT CHANGE UP (ref 22–26)
CO2 SERPL-SCNC: 17 MMOL/L — LOW (ref 22–31)
CO2 SERPL-SCNC: 23 MMOL/L — SIGNIFICANT CHANGE UP (ref 22–31)
CREAT SERPL-MCNC: 1.22 MG/DL — SIGNIFICANT CHANGE UP (ref 0.5–1.3)
CREAT SERPL-MCNC: 1.47 MG/DL — HIGH (ref 0.5–1.3)
D DIMER BLD IA.RAPID-MCNC: 951 NG/ML DDU — HIGH
EGFR: 45 ML/MIN/1.73M2 — LOW
EGFR: 45 ML/MIN/1.73M2 — LOW
EGFR: 56 ML/MIN/1.73M2 — LOW
EGFR: 56 ML/MIN/1.73M2 — LOW
EOSINOPHIL # BLD AUTO: 0.02 K/UL — SIGNIFICANT CHANGE UP (ref 0–0.5)
EOSINOPHIL # BLD AUTO: 0.06 K/UL — SIGNIFICANT CHANGE UP (ref 0–0.5)
EOSINOPHIL NFR BLD AUTO: 0.2 % — SIGNIFICANT CHANGE UP (ref 0–6)
EOSINOPHIL NFR BLD AUTO: 0.5 % — SIGNIFICANT CHANGE UP (ref 0–6)
GAS PNL BLDV: SIGNIFICANT CHANGE UP
GAS PNL BLDV: SIGNIFICANT CHANGE UP
GLUCOSE BLDC GLUCOMTR-MCNC: 162 MG/DL — HIGH (ref 70–99)
GLUCOSE BLDC GLUCOMTR-MCNC: 185 MG/DL — HIGH (ref 70–99)
GLUCOSE BLDC GLUCOMTR-MCNC: 195 MG/DL — HIGH (ref 70–99)
GLUCOSE BLDC GLUCOMTR-MCNC: 218 MG/DL — HIGH (ref 70–99)
GLUCOSE BLDC GLUCOMTR-MCNC: 255 MG/DL — HIGH (ref 70–99)
GLUCOSE SERPL-MCNC: 297 MG/DL — HIGH (ref 70–99)
GLUCOSE SERPL-MCNC: 321 MG/DL — HIGH (ref 70–99)
HCO3 BLDV-SCNC: 22 MMOL/L — SIGNIFICANT CHANGE UP (ref 22–28)
HCT VFR BLD CALC: 33.8 % — LOW (ref 34.5–45)
HCT VFR BLD CALC: 34.5 % — SIGNIFICANT CHANGE UP (ref 34.5–45)
HCT VFR BLD CALC: 34.9 % — SIGNIFICANT CHANGE UP (ref 34.5–45)
HGB BLD-MCNC: 10.6 G/DL — LOW (ref 11.5–15.5)
HGB BLD-MCNC: 10.9 G/DL — LOW (ref 11.5–15.5)
HGB BLD-MCNC: 11.1 G/DL — LOW (ref 11.5–15.5)
IMM GRANULOCYTES # BLD AUTO: 0.05 K/UL — SIGNIFICANT CHANGE UP (ref 0–0.07)
IMM GRANULOCYTES NFR BLD AUTO: 0.4 % — SIGNIFICANT CHANGE UP (ref 0–0.9)
IMM GRANULOCYTES NFR BLD AUTO: 0.5 % — SIGNIFICANT CHANGE UP (ref 0–0.9)
INR BLD: 1.15 RATIO — SIGNIFICANT CHANGE UP (ref 0.85–1.16)
LIDOCAIN IGE QN: 26 U/L — SIGNIFICANT CHANGE UP (ref 13–75)
LYMPHOCYTES # BLD AUTO: 1.07 K/UL — SIGNIFICANT CHANGE UP (ref 1–3.3)
LYMPHOCYTES # BLD AUTO: 1.18 K/UL — SIGNIFICANT CHANGE UP (ref 1–3.3)
LYMPHOCYTES # BLD AUTO: 9.1 % — LOW (ref 13–44)
LYMPHOCYTES NFR BLD AUTO: 9.5 % — LOW (ref 13–44)
MAGNESIUM SERPL-MCNC: 1.6 MG/DL — SIGNIFICANT CHANGE UP (ref 1.6–2.6)
MAGNESIUM SERPL-MCNC: 1.7 MG/DL — SIGNIFICANT CHANGE UP (ref 1.6–2.6)
MCHC RBC-ENTMCNC: 24.3 PG — LOW (ref 27–34)
MCHC RBC-ENTMCNC: 24.3 PG — LOW (ref 27–34)
MCHC RBC-ENTMCNC: 24.6 PG — LOW (ref 27–34)
MCHC RBC-ENTMCNC: 31.2 G/DL — LOW (ref 32–36)
MCHC RBC-ENTMCNC: 31.4 G/DL — LOW (ref 32–36)
MCHC RBC-ENTMCNC: 32.2 G/DL — SIGNIFICANT CHANGE UP (ref 32–36)
MCV RBC AUTO: 76.3 FL — LOW (ref 80–100)
MCV RBC AUTO: 77.5 FL — LOW (ref 80–100)
MCV RBC AUTO: 77.7 FL — LOW (ref 80–100)
MONOCYTES # BLD AUTO: 0.67 K/UL — SIGNIFICANT CHANGE UP (ref 0–0.9)
MONOCYTES # BLD AUTO: 0.69 K/UL — SIGNIFICANT CHANGE UP (ref 0–0.9)
MONOCYTES NFR BLD AUTO: 5.4 % — SIGNIFICANT CHANGE UP (ref 2–14)
MONOCYTES NFR BLD AUTO: 5.9 % — SIGNIFICANT CHANGE UP (ref 2–14)
NEUTROPHILS # BLD AUTO: 10.47 K/UL — HIGH (ref 1.8–7.4)
NEUTROPHILS # BLD AUTO: 9.85 K/UL — HIGH (ref 1.8–7.4)
NEUTROPHILS NFR BLD AUTO: 83.6 % — HIGH (ref 43–77)
NEUTROPHILS NFR BLD AUTO: 84.2 % — HIGH (ref 43–77)
NRBC # BLD AUTO: 0 K/UL — SIGNIFICANT CHANGE UP (ref 0–0)
NRBC # BLD AUTO: 0.02 K/UL — HIGH (ref 0–0)
NRBC # FLD: 0 K/UL — SIGNIFICANT CHANGE UP (ref 0–0)
NRBC # FLD: 0.02 K/UL — HIGH (ref 0–0)
NRBC BLD AUTO-RTO: 0 /100 WBCS — SIGNIFICANT CHANGE UP (ref 0–0)
NT-PROBNP SERPL-SCNC: 4200 PG/ML — HIGH (ref 0–125)
NT-PROBNP SERPL-SCNC: 4676 PG/ML — HIGH (ref 0–300)
PCO2 BLDV: 41 MMHG — LOW (ref 42–55)
PH BLDV: 7.33 — SIGNIFICANT CHANGE UP (ref 7.32–7.43)
PLATELET # BLD AUTO: 347 K/UL — SIGNIFICANT CHANGE UP (ref 150–400)
PLATELET # BLD AUTO: 354 K/UL — SIGNIFICANT CHANGE UP (ref 150–400)
PLATELET # BLD AUTO: 390 K/UL — SIGNIFICANT CHANGE UP (ref 150–400)
PMV BLD: 10.3 FL — SIGNIFICANT CHANGE UP (ref 7–13)
PMV BLD: 11.3 FL — SIGNIFICANT CHANGE UP (ref 7–13)
PO2 BLDV: 37 MMHG — SIGNIFICANT CHANGE UP (ref 25–45)
POTASSIUM SERPL-MCNC: 4.9 MMOL/L — SIGNIFICANT CHANGE UP (ref 3.5–5.3)
POTASSIUM SERPL-MCNC: 5.9 MMOL/L — HIGH (ref 3.5–5.3)
POTASSIUM SERPL-SCNC: 4.9 MMOL/L — SIGNIFICANT CHANGE UP (ref 3.5–5.3)
POTASSIUM SERPL-SCNC: 5.9 MMOL/L — HIGH (ref 3.5–5.3)
PROT SERPL-MCNC: 7.9 G/DL — SIGNIFICANT CHANGE UP (ref 6–8.3)
PROT SERPL-MCNC: 8 GM/DL — SIGNIFICANT CHANGE UP (ref 6–8.3)
PROTHROM AB SERPL-ACNC: 13.1 SEC — SIGNIFICANT CHANGE UP (ref 9.9–13.4)
RBC # BLD: 4.36 M/UL — SIGNIFICANT CHANGE UP (ref 3.8–5.2)
RBC # BLD: 4.49 M/UL — SIGNIFICANT CHANGE UP (ref 3.8–5.2)
RBC # BLD: 4.52 M/UL — SIGNIFICANT CHANGE UP (ref 3.8–5.2)
RBC # FLD: 17.1 % — HIGH (ref 10.3–14.5)
RBC # FLD: 17.2 % — HIGH (ref 10.3–14.5)
RBC # FLD: 17.3 % — HIGH (ref 10.3–14.5)
RH IG SCN BLD-IMP: POSITIVE — SIGNIFICANT CHANGE UP
SAO2 % BLDV: 57 % — LOW (ref 94–98)
SODIUM SERPL-SCNC: 127 MMOL/L — LOW (ref 135–145)
SODIUM SERPL-SCNC: 128 MMOL/L — LOW (ref 135–145)
TROPONIN I, HIGH SENSITIVITY RESULT: 107.5 NG/L — HIGH
TROPONIN T, HIGH SENSITIVITY RESULT: 32 NG/L — SIGNIFICANT CHANGE UP (ref 0–51)
WBC # BLD: 11.1 K/UL — HIGH (ref 3.8–10.5)
WBC # BLD: 11.78 K/UL — HIGH (ref 3.8–10.5)
WBC # BLD: 12.43 K/UL — HIGH (ref 3.8–10.5)
WBC # FLD AUTO: 11.1 K/UL — HIGH (ref 3.8–10.5)
WBC # FLD AUTO: 11.78 K/UL — HIGH (ref 3.8–10.5)
WBC # FLD AUTO: 12.43 K/UL — HIGH (ref 3.8–10.5)

## 2025-07-14 PROCEDURE — 99223 1ST HOSP IP/OBS HIGH 75: CPT | Mod: GC

## 2025-07-14 PROCEDURE — 83605 ASSAY OF LACTIC ACID: CPT

## 2025-07-14 PROCEDURE — 86900 BLOOD TYPING SEROLOGIC ABO: CPT

## 2025-07-14 PROCEDURE — 85018 HEMOGLOBIN: CPT

## 2025-07-14 PROCEDURE — 36415 COLL VENOUS BLD VENIPUNCTURE: CPT

## 2025-07-14 PROCEDURE — 85025 COMPLETE CBC W/AUTO DIFF WBC: CPT

## 2025-07-14 PROCEDURE — 85014 HEMATOCRIT: CPT

## 2025-07-14 PROCEDURE — 82330 ASSAY OF CALCIUM: CPT

## 2025-07-14 PROCEDURE — 83880 ASSAY OF NATRIURETIC PEPTIDE: CPT

## 2025-07-14 PROCEDURE — 93308 TTE F-UP OR LMTD: CPT | Mod: 26

## 2025-07-14 PROCEDURE — 84295 ASSAY OF SERUM SODIUM: CPT

## 2025-07-14 PROCEDURE — 93306 TTE W/DOPPLER COMPLETE: CPT | Mod: 26

## 2025-07-14 PROCEDURE — 99233 SBSQ HOSP IP/OBS HIGH 50: CPT

## 2025-07-14 PROCEDURE — 80053 COMPREHEN METABOLIC PANEL: CPT

## 2025-07-14 PROCEDURE — 85027 COMPLETE CBC AUTOMATED: CPT

## 2025-07-14 PROCEDURE — 82435 ASSAY OF BLOOD CHLORIDE: CPT

## 2025-07-14 PROCEDURE — 83735 ASSAY OF MAGNESIUM: CPT

## 2025-07-14 PROCEDURE — 93970 EXTREMITY STUDY: CPT | Mod: 26

## 2025-07-14 PROCEDURE — 12011 RPR F/E/E/N/L/M 2.5 CM/<: CPT

## 2025-07-14 PROCEDURE — 93010 ELECTROCARDIOGRAM REPORT: CPT

## 2025-07-14 PROCEDURE — 71275 CT ANGIOGRAPHY CHEST: CPT | Mod: 26

## 2025-07-14 PROCEDURE — 93005 ELECTROCARDIOGRAM TRACING: CPT

## 2025-07-14 PROCEDURE — 86901 BLOOD TYPING SEROLOGIC RH(D): CPT

## 2025-07-14 PROCEDURE — G0452: CPT | Mod: 26

## 2025-07-14 PROCEDURE — 85730 THROMBOPLASTIN TIME PARTIAL: CPT

## 2025-07-14 PROCEDURE — 85610 PROTHROMBIN TIME: CPT

## 2025-07-14 PROCEDURE — 84132 ASSAY OF SERUM POTASSIUM: CPT

## 2025-07-14 PROCEDURE — 82947 ASSAY GLUCOSE BLOOD QUANT: CPT

## 2025-07-14 PROCEDURE — 84484 ASSAY OF TROPONIN QUANT: CPT

## 2025-07-14 PROCEDURE — 86850 RBC ANTIBODY SCREEN: CPT

## 2025-07-14 PROCEDURE — 82803 BLOOD GASES ANY COMBINATION: CPT

## 2025-07-14 PROCEDURE — 82962 GLUCOSE BLOOD TEST: CPT

## 2025-07-14 PROCEDURE — 93308 TTE F-UP OR LMTD: CPT

## 2025-07-14 PROCEDURE — 99291 CRITICAL CARE FIRST HOUR: CPT | Mod: 25

## 2025-07-14 RX ORDER — INSULIN LISPRO 100 U/ML
5 INJECTION, SOLUTION INTRAVENOUS; SUBCUTANEOUS ONCE
Refills: 0 | Status: COMPLETED | OUTPATIENT
Start: 2025-07-14 | End: 2025-07-14

## 2025-07-14 RX ORDER — METOPROLOL SUCCINATE 50 MG/1
1 TABLET, EXTENDED RELEASE ORAL
Refills: 0 | DISCHARGE

## 2025-07-14 RX ORDER — DEXTROSE 50 % IN WATER 50 %
25 SYRINGE (ML) INTRAVENOUS ONCE
Refills: 0 | Status: DISCONTINUED | OUTPATIENT
Start: 2025-07-14 | End: 2025-07-17

## 2025-07-14 RX ORDER — GLUCAGON 3 MG/1
1 POWDER NASAL ONCE
Refills: 0 | Status: DISCONTINUED | OUTPATIENT
Start: 2025-07-14 | End: 2025-07-17

## 2025-07-14 RX ORDER — WHITE PETROLATUM 1 G/G
1 OINTMENT TOPICAL THREE TIMES A DAY
Refills: 0 | Status: DISCONTINUED | OUTPATIENT
Start: 2025-07-14 | End: 2025-07-21

## 2025-07-14 RX ORDER — DEXTROSE 50 % IN WATER 50 %
15 SYRINGE (ML) INTRAVENOUS ONCE
Refills: 0 | Status: DISCONTINUED | OUTPATIENT
Start: 2025-07-14 | End: 2025-07-17

## 2025-07-14 RX ORDER — HEPARIN SODIUM 1000 [USP'U]/ML
8500 INJECTION INTRAVENOUS; SUBCUTANEOUS ONCE
Refills: 0 | Status: COMPLETED | OUTPATIENT
Start: 2025-07-14 | End: 2025-07-14

## 2025-07-14 RX ORDER — INSULIN GLARGINE-YFGN 100 [IU]/ML
15 INJECTION, SOLUTION SUBCUTANEOUS
Refills: 0 | DISCHARGE

## 2025-07-14 RX ORDER — HEPARIN SODIUM 1000 [USP'U]/ML
4000 INJECTION INTRAVENOUS; SUBCUTANEOUS EVERY 6 HOURS
Refills: 0 | Status: DISCONTINUED | OUTPATIENT
Start: 2025-07-14 | End: 2025-07-21

## 2025-07-14 RX ORDER — ACETAMINOPHEN 500 MG/5ML
650 LIQUID (ML) ORAL EVERY 6 HOURS
Refills: 0 | Status: DISCONTINUED | OUTPATIENT
Start: 2025-07-14 | End: 2025-07-21

## 2025-07-14 RX ORDER — ASPIRIN 325 MG
81 TABLET ORAL DAILY
Refills: 0 | Status: DISCONTINUED | OUTPATIENT
Start: 2025-07-14 | End: 2025-07-21

## 2025-07-14 RX ORDER — DEXTROSE 50 % IN WATER 50 %
25 SYRINGE (ML) INTRAVENOUS ONCE
Refills: 0 | Status: DISCONTINUED | OUTPATIENT
Start: 2025-07-14 | End: 2025-07-14

## 2025-07-14 RX ORDER — LIDOCAINE HCL/PF 10 MG/ML
10 VIAL (ML) INJECTION ONCE
Refills: 0 | Status: COMPLETED | OUTPATIENT
Start: 2025-07-14 | End: 2025-07-14

## 2025-07-14 RX ORDER — INSULIN LISPRO 100 U/ML
INJECTION, SOLUTION INTRAVENOUS; SUBCUTANEOUS AT BEDTIME
Refills: 0 | Status: DISCONTINUED | OUTPATIENT
Start: 2025-07-14 | End: 2025-07-17

## 2025-07-14 RX ORDER — HEPARIN SODIUM 1000 [USP'U]/ML
4000 INJECTION INTRAVENOUS; SUBCUTANEOUS EVERY 6 HOURS
Refills: 0 | Status: DISCONTINUED | OUTPATIENT
Start: 2025-07-14 | End: 2025-07-14

## 2025-07-14 RX ORDER — HEPARIN SODIUM 1000 [USP'U]/ML
8500 INJECTION INTRAVENOUS; SUBCUTANEOUS ONCE
Refills: 0 | Status: DISCONTINUED | OUTPATIENT
Start: 2025-07-14 | End: 2025-07-14

## 2025-07-14 RX ORDER — SODIUM CHLORIDE 9 G/1000ML
1000 INJECTION, SOLUTION INTRAVENOUS
Refills: 0 | Status: DISCONTINUED | OUTPATIENT
Start: 2025-07-14 | End: 2025-07-17

## 2025-07-14 RX ORDER — MAGNESIUM, ALUMINUM HYDROXIDE 200-200 MG
30 TABLET,CHEWABLE ORAL EVERY 4 HOURS
Refills: 0 | Status: DISCONTINUED | OUTPATIENT
Start: 2025-07-14 | End: 2025-07-21

## 2025-07-14 RX ORDER — DEXTROSE 50 % IN WATER 50 %
15 SYRINGE (ML) INTRAVENOUS ONCE
Refills: 0 | Status: DISCONTINUED | OUTPATIENT
Start: 2025-07-14 | End: 2025-07-14

## 2025-07-14 RX ORDER — DAPAGLIFLOZIN 5 MG/1
1 TABLET, FILM COATED ORAL
Refills: 0 | DISCHARGE

## 2025-07-14 RX ORDER — HEPARIN SODIUM 1000 [USP'U]/ML
INJECTION INTRAVENOUS; SUBCUTANEOUS
Qty: 25000 | Refills: 0 | Status: DISCONTINUED | OUTPATIENT
Start: 2025-07-14 | End: 2025-07-14

## 2025-07-14 RX ORDER — INSULIN LISPRO 100 U/ML
INJECTION, SOLUTION INTRAVENOUS; SUBCUTANEOUS
Refills: 0 | Status: DISCONTINUED | OUTPATIENT
Start: 2025-07-14 | End: 2025-07-14

## 2025-07-14 RX ORDER — DEXTROSE 50 % IN WATER 50 %
12.5 SYRINGE (ML) INTRAVENOUS ONCE
Refills: 0 | Status: DISCONTINUED | OUTPATIENT
Start: 2025-07-14 | End: 2025-07-17

## 2025-07-14 RX ORDER — ASPIRIN 325 MG
1 TABLET ORAL
Refills: 0 | DISCHARGE

## 2025-07-14 RX ORDER — HEPARIN SODIUM 1000 [USP'U]/ML
8500 INJECTION INTRAVENOUS; SUBCUTANEOUS EVERY 6 HOURS
Refills: 0 | Status: DISCONTINUED | OUTPATIENT
Start: 2025-07-14 | End: 2025-07-14

## 2025-07-14 RX ORDER — SODIUM CHLORIDE 9 G/1000ML
1000 INJECTION, SOLUTION INTRAVENOUS
Refills: 0 | Status: DISCONTINUED | OUTPATIENT
Start: 2025-07-14 | End: 2025-07-14

## 2025-07-14 RX ORDER — ERGOCALCIFEROL 1.25 MG/1
1 CAPSULE ORAL
Refills: 0 | DISCHARGE

## 2025-07-14 RX ORDER — INSULIN LISPRO 100 U/ML
INJECTION, SOLUTION INTRAVENOUS; SUBCUTANEOUS
Refills: 0 | Status: DISCONTINUED | OUTPATIENT
Start: 2025-07-14 | End: 2025-07-17

## 2025-07-14 RX ORDER — ATORVASTATIN CALCIUM 80 MG/1
10 TABLET, FILM COATED ORAL AT BEDTIME
Refills: 0 | Status: DISCONTINUED | OUTPATIENT
Start: 2025-07-15 | End: 2025-07-21

## 2025-07-14 RX ORDER — INSULIN GLARGINE-YFGN 100 [IU]/ML
15 INJECTION, SOLUTION SUBCUTANEOUS AT BEDTIME
Refills: 0 | Status: DISCONTINUED | OUTPATIENT
Start: 2025-07-14 | End: 2025-07-17

## 2025-07-14 RX ORDER — LOSARTAN POTASSIUM AND HYDROCHLOROTHIAZIDE 12.5; 5 MG/1; MG/1
1 TABLET ORAL
Refills: 0 | DISCHARGE

## 2025-07-14 RX ORDER — GLUCAGON 3 MG/1
1 POWDER NASAL ONCE
Refills: 0 | Status: DISCONTINUED | OUTPATIENT
Start: 2025-07-14 | End: 2025-07-14

## 2025-07-14 RX ORDER — MELATONIN 5 MG
3 TABLET ORAL AT BEDTIME
Refills: 0 | Status: DISCONTINUED | OUTPATIENT
Start: 2025-07-14 | End: 2025-07-21

## 2025-07-14 RX ORDER — LIDOCAINE HCL/PF 20 MG/ML
5 AMPUL, LUER TIP INJECTION ONCE
Refills: 0 | Status: DISCONTINUED | OUTPATIENT
Start: 2025-07-14 | End: 2025-07-14

## 2025-07-14 RX ORDER — METFORMIN HYDROCHLORIDE 850 MG/1
1 TABLET ORAL
Refills: 0 | DISCHARGE

## 2025-07-14 RX ORDER — HEPARIN SODIUM 1000 [USP'U]/ML
INJECTION INTRAVENOUS; SUBCUTANEOUS
Qty: 25000 | Refills: 0 | Status: DISCONTINUED | OUTPATIENT
Start: 2025-07-14 | End: 2025-07-21

## 2025-07-14 RX ORDER — ONDANSETRON HCL/PF 4 MG/2 ML
4 VIAL (ML) INJECTION EVERY 8 HOURS
Refills: 0 | Status: DISCONTINUED | OUTPATIENT
Start: 2025-07-14 | End: 2025-07-21

## 2025-07-14 RX ORDER — HEPARIN SODIUM 1000 [USP'U]/ML
8500 INJECTION INTRAVENOUS; SUBCUTANEOUS EVERY 6 HOURS
Refills: 0 | Status: DISCONTINUED | OUTPATIENT
Start: 2025-07-14 | End: 2025-07-21

## 2025-07-14 RX ORDER — DEXTROSE 50 % IN WATER 50 %
12.5 SYRINGE (ML) INTRAVENOUS ONCE
Refills: 0 | Status: DISCONTINUED | OUTPATIENT
Start: 2025-07-14 | End: 2025-07-14

## 2025-07-14 RX ADMIN — INSULIN LISPRO 1: 100 INJECTION, SOLUTION INTRAVENOUS; SUBCUTANEOUS at 18:17

## 2025-07-14 RX ADMIN — HEPARIN SODIUM 1800 UNIT(S)/HR: 1000 INJECTION INTRAVENOUS; SUBCUTANEOUS at 07:20

## 2025-07-14 RX ADMIN — HEPARIN SODIUM 4000 UNIT(S): 1000 INJECTION INTRAVENOUS; SUBCUTANEOUS at 16:39

## 2025-07-14 RX ADMIN — HEPARIN SODIUM 1800 UNIT(S)/HR: 1000 INJECTION INTRAVENOUS; SUBCUTANEOUS at 10:12

## 2025-07-14 RX ADMIN — WHITE PETROLATUM 1 APPLICATION(S): 1 OINTMENT TOPICAL at 21:21

## 2025-07-14 RX ADMIN — HEPARIN SODIUM 1800 UNIT(S)/HR: 1000 INJECTION INTRAVENOUS; SUBCUTANEOUS at 03:24

## 2025-07-14 RX ADMIN — HEPARIN SODIUM 8500 UNIT(S): 1000 INJECTION INTRAVENOUS; SUBCUTANEOUS at 01:57

## 2025-07-14 RX ADMIN — HEPARIN SODIUM 1800 UNIT(S)/HR: 1000 INJECTION INTRAVENOUS; SUBCUTANEOUS at 15:08

## 2025-07-14 RX ADMIN — INSULIN GLARGINE-YFGN 15 UNIT(S): 100 INJECTION, SOLUTION SUBCUTANEOUS at 23:33

## 2025-07-14 RX ADMIN — INSULIN LISPRO 5 UNIT(S): 100 INJECTION, SOLUTION INTRAVENOUS; SUBCUTANEOUS at 10:24

## 2025-07-14 RX ADMIN — HEPARIN SODIUM 2000 UNIT(S)/HR: 1000 INJECTION INTRAVENOUS; SUBCUTANEOUS at 16:38

## 2025-07-14 RX ADMIN — Medication 650 MILLIGRAM(S): at 16:42

## 2025-07-14 RX ADMIN — WHITE PETROLATUM 1 APPLICATION(S): 1 OINTMENT TOPICAL at 14:18

## 2025-07-14 RX ADMIN — HEPARIN SODIUM 2000 UNIT(S)/HR: 1000 INJECTION INTRAVENOUS; SUBCUTANEOUS at 23:02

## 2025-07-14 RX ADMIN — Medication 10 MILLILITER(S): at 06:43

## 2025-07-14 NOTE — ED ADULT NURSE NOTE - NSFALLRISKINTERV_ED_ALL_ED
Assistance OOB with selected safe patient handling equipment if applicable/Assistance with ambulation/Communicate fall risk and risk factors to all staff, patient, and family/Orthostatic vital signs/Provide visual cue: yellow wristband, yellow gown, etc/Reinforce activity limits and safety measures with patient and family/Call bell, personal items and telephone in reach/Instruct patient to call for assistance before getting out of bed/chair/stretcher/Non-slip footwear applied when patient is off stretcher/[place] to call system/Physically safe environment - no spills, clutter or unnecessary equipment/Purposeful Proactive Rounding/Room/bathroom lighting operational, light cord in reach

## 2025-07-14 NOTE — ED ADULT NURSE NOTE - NSFALLASSESSNEED_ED_ALL_ED
STENT PLACEMENT  2001,2008    CYST REMOVAL  1982    coccyx area    DIAGNOSTIC CARDIAC CATH LAB PROCEDURE      ENDOSCOPY, COLON, DIAGNOSTIC      ERCP  9/15/2013    ERCP  10/11/13    WITH STENT REMOVAL    FOOT SURGERY Right 07/09/2018     REPAIR CALCANEAL SPUR, REPAIR OF ACHILLES TENDON RIGHT FOOT    OTHER SURGICAL HISTORY      back stimulator in lower back    PACEMAKER PLACEMENT      ICD    NE GASTROCNEMIUS RECESSION Right 10/31/2018    GASTROCNEMIUS RECESSION RIGHT FOOT performed by Anjali Maharaj DPM at [address] Right 10/31/2018    REMOVAL OF CALCANEAL SPUR, ACHILLES TENDON REPAIR RIGHT LOWER EXTREMITY performed by Anjali Maharaj DPM at [address]  7/18/13    and colonoscopy with biopsies taken    UPPER GASTROINTESTINAL ENDOSCOPY  8/23/13    EUS    UPPER GASTROINTESTINAL ENDOSCOPY  9/15/2013       Medications:  Discharge Medication List as of 5/24/2019 12:13 PM      CONTINUE these medications which have NOT CHANGED    Details   Fexofenadine HCl (ALLEGRA PO) Take by mouthHistorical Med      Esomeprazole Magnesium (NEXIUM PO) Take by mouthHistorical Med      JARDIANCE 25 MG tablet Take 25 mg by mouth daily , DAWHistorical Med      potassium chloride (KLOR-CON M) 20 MEQ extended release tablet TAKE ONE TABLET BY MOUTH WITH LASIX ON MONDAY AND THURSDAY, Disp-30 tablet, R-5Normal      furosemide (LASIX) 20 MG tablet TAKE ONE TABLET BY MOUTH MONDAY AND THURSDAY, Disp-30 tablet, R-5Normal      nitroGLYCERIN (NITROLINGUAL) 0.4 MG/SPRAY 0.4 mg spray Place 1 spray under the tongue every 5 minutes as needed for Chest pain, Disp-2 Bottle, R-3Normal      insulin detemir (LEVEMIR) 100 UNIT/ML injection vial Inject 60 Units into the skin nightly Historical Med      ibuprofen (ADVIL;MOTRIN) 200 MG tablet Take 4 tablets by mouth every 8 hours as needed for Pain, Disp-30 tablet, R-0Print      Fluticasone Furoate-Vilanterol (BREO ELLIPTA IN) Inhale into the lungs daily Historical Med      insulin lispro (HUMALOG) 100 UNIT/ML injection vial Inject into the skin 2 times daily Indications: 10 units in AM, 10 units at dinner Historical Med      clopidogrel (PLAVIX) 75 MG tablet TAKE ONE TABLET BY MOUTH DAILY, Disp-30 tablet, R-0Normal      metFORMIN (GLUCOPHAGE) 1000 MG tablet TAKE ONE TABLET BY MOUTH 2 TIMES DAILY WITH MORNING AND EVENING MEALS, Disp-60 tablet, R-0Normal      cyclobenzaprine (FLEXERIL) 10 MG tablet TAKE 1 TABLET BY MOUTH DAILY, MAY CAUSE DROWSINESS, Disp-30 tablet, R-0Normal      famotidine (PEPCID) 20 MG tablet TAKE 1 TABLET BY MOUTH 2 TIMES DAILY, Disp-60 tablet, R-0Normal      gabapentin (NEURONTIN) 100 MG capsule TAKE 2 CAPSULES BY MOUTH 3 TIMES DAILY, Disp-180 capsule, R-0Normal      fluticasone (FLONASE) 50 MCG/ACT nasal spray USE 1 SPRAY IN EACH NOSTRIL EVERY DAY, Disp-16 g, R-0Normal      albuterol sulfate HFA (PROAIR HFA) 108 (90 BASE) MCG/ACT inhaler Inhale 2 puffs into the lungs every 6 hours as needed for Wheezing, Disp-1 Inhaler, R-0Print      lisinopril (PRINIVIL;ZESTRIL) 20 MG tablet TAKE 1 TABLET BY MOUTH DAILY, Disp-30 tablet, R-11      atorvastatin (LIPITOR) 80 MG tablet TAKE ONE TABLET BY MOUTH DAILY, Disp-30 tablet, R-11      aspirin 325 MG EC tablet TAKE ONE TABLET BY MOUTH DAILY, Disp-30 tablet, R-11      diltiazem (CARDIZEM CD) 180 MG extended release capsule TAKE 1 CAPSULE DAILY, Disp-30 capsule, R-11      metoprolol succinate (TOPROL XL) 200 MG extended release tablet TAKE ONE-HALF TABLET TWICE A DAY, Disp-30 tablet, R-11      DULoxetine (CYMBALTA) 60 MG extended release capsule Take 60 mg by mouth daily      divalproex (DEPAKOTE) 500 MG DR tablet Take 1,000 mg by mouth nightly      Liraglutide (VICTOZA SC) Inject 1.8 Units into the skin daily.       TRUE METRIX BLOOD GLUCOSE TEST strip CHECK SUGARS TWICE A DAY AS DIRECTED, DX: E11.9, Disp-100 each, R-5He uses Leader True Metrix Brand Meter and stripsNormal UNIFINE PENTIPS 31G X 8 MM MISC Disp-100 each, R-5, RADHA, Normal      TRUEPLUS LANCETS 30G MISC Disp-100 each, R-11, Normal               Review of Systems:  Review of Systems  Positives and Pertinent negatives as per HPI. Except as noted above in the ROS, problem specific ROS was completed and is negative. Physical Exam:  Physical Exam   Constitutional: He is oriented to person, place, and time. He appears well-developed and well-nourished. HENT:   Head: Normocephalic and atraumatic. Right Ear: Hearing, tympanic membrane, external ear and ear canal normal.   Left Ear: Hearing, tympanic membrane, external ear and ear canal normal.   Nose: Mucosal edema present. Right sinus exhibits frontal sinus tenderness. Eyes: Conjunctivae are normal. Right eye exhibits no discharge. Left eye exhibits no discharge. No scleral icterus. Neck: Normal range of motion. Neck supple. Cardiovascular: Normal rate, regular rhythm and normal heart sounds. Pulmonary/Chest: Effort normal and breath sounds normal.   Musculoskeletal: Normal range of motion. He exhibits no edema. Lymphadenopathy:     He has no cervical adenopathy. Neurological: He is alert and oriented to person, place, and time. Skin: Skin is warm and dry. No rash noted. Psychiatric: He has a normal mood and affect. His behavior is normal. Judgment and thought content normal.   Nursing note and vitals reviewed. MEDICAL DECISION MAKING    Vitals:    Vitals:    05/24/19 1125   BP: 127/71   Pulse: 87   Resp: 18   Temp: 97.2 °F (36.2 °C)   TempSrc: Oral   SpO2: 95%   Weight: 241 lb (109.3 kg)   Height: 5' 4.5\" (1.638 m)       LABS:Labs Reviewed - No data to display     Remainder of labs reviewed and werenegative at this time or not returned at the time of this note.     RADIOLOGY:   Non-plain film images such as CT, Ultrasound and MRI are read by the radiologist. Diogo Robin PA-C have directly visualized the radiologic plain film image(s) with the yes

## 2025-07-14 NOTE — ED ADULT NURSE REASSESSMENT NOTE - NS ED NURSE REASSESS COMMENT FT1
Patient is AAOx4, sitting comfortably in bed with no complaints at this time. Respirations equal and unlabored. No acute distress noted at this time. EMS transport team here to bring patient to [facility] ED.

## 2025-07-14 NOTE — ED ADULT NURSE NOTE - OBJECTIVE STATEMENT
Pt AAOx4. 44 year old female with past medical history of DM, HTN, MI (2015), PE (stopped her eliquis a month ago due to insurance issues), presents to ED with complaint of syncopal episode that lasted 4 minutes per family. Pt endorsing shortness of breath at this time, placed on NRB, spO2 98% at this time. Denies any other complaints.CM

## 2025-07-14 NOTE — ED PROVIDER NOTE - CLINICAL SUMMARY MEDICAL DECISION MAKING FREE TEXT BOX
Adult female with with hx of CAD, MI, PE, patient presenting to the ED reporting syncopal episode.  Patient was sitting at the dinner table when she passed out and fell.  She was complaining of dizziness earlier today.  She passed out briefly her family said she turned blue.  She split her lip.  She woke up return back to baseline no seizure-like activity.  Complaining of intermittent chest pain and shortness of breath.  Had diaphoresis and was clammy.  States that she lost her job recently ran out of health insurance and has been off of her Eliquis for about a month.  She used to be on 2.5 mg twice daily.  She said that she had a large hematological workup years ago when she got diagnosed with the PT and said it was all normal antiphospholipid was negative.  Cancer malignancy workup was negative.    She is tachycardic.  Blood pressure is 108/62.  Heart rate 115.  Afebrile.  Hypoxic to 77 on room air as per the RN.  Currently on 4 L of nasal cannula appears comfortable  Well-appearing neurovascularly intact airway intact no respiratory distress positive hypoxia lungs CTA all fields no abdominal tenderness WWP x 4 no peripheral nonfocal neuroexam skin is normal no cyanosis    Plan to obtain:  -CTA shows bilateral PEs extensive with right heart strain.  Troponin elevated right bundle branch block on the twelve-lead.  PE with right heart strain PERC team consulted tier 1 transfer to tertiary hospital for PERT team evaluation.  Heparin initiated in the ED.

## 2025-07-15 LAB
A1C WITH ESTIMATED AVERAGE GLUCOSE RESULT: 11.4 % — HIGH (ref 4–5.6)
ANION GAP SERPL CALC-SCNC: 16 MMOL/L — SIGNIFICANT CHANGE UP (ref 5–17)
APTT BLD: 62.7 SEC — HIGH (ref 26.1–36.8)
BUN SERPL-MCNC: 16 MG/DL — SIGNIFICANT CHANGE UP (ref 7–23)
CALCIUM SERPL-MCNC: 9.7 MG/DL — SIGNIFICANT CHANGE UP (ref 8.4–10.5)
CHLORIDE SERPL-SCNC: 98 MMOL/L — SIGNIFICANT CHANGE UP (ref 96–108)
CHOLEST SERPL-MCNC: 133 MG/DL — SIGNIFICANT CHANGE UP
CO2 SERPL-SCNC: 18 MMOL/L — LOW (ref 22–31)
CREAT SERPL-MCNC: 0.85 MG/DL — SIGNIFICANT CHANGE UP (ref 0.5–1.3)
EGFR: 86 ML/MIN/1.73M2 — SIGNIFICANT CHANGE UP
EGFR: 86 ML/MIN/1.73M2 — SIGNIFICANT CHANGE UP
ESTIMATED AVERAGE GLUCOSE: 280 MG/DL — HIGH (ref 68–114)
GLUCOSE BLDC GLUCOMTR-MCNC: 166 MG/DL — HIGH (ref 70–99)
GLUCOSE BLDC GLUCOMTR-MCNC: 182 MG/DL — HIGH (ref 70–99)
GLUCOSE BLDC GLUCOMTR-MCNC: 192 MG/DL — HIGH (ref 70–99)
GLUCOSE BLDC GLUCOMTR-MCNC: 244 MG/DL — HIGH (ref 70–99)
GLUCOSE SERPL-MCNC: 174 MG/DL — HIGH (ref 70–99)
HCT VFR BLD CALC: 35.7 % — SIGNIFICANT CHANGE UP (ref 34.5–45)
HDLC SERPL-MCNC: 30 MG/DL — LOW
HGB BLD-MCNC: 10.9 G/DL — LOW (ref 11.5–15.5)
LDLC SERPL-MCNC: 70 MG/DL — SIGNIFICANT CHANGE UP
LIPID PNL WITH DIRECT LDL SERPL: 70 MG/DL — SIGNIFICANT CHANGE UP
MAGNESIUM SERPL-MCNC: 1.6 MG/DL — SIGNIFICANT CHANGE UP (ref 1.6–2.6)
MCHC RBC-ENTMCNC: 23.9 PG — LOW (ref 27–34)
MCHC RBC-ENTMCNC: 30.5 G/DL — LOW (ref 32–36)
MCV RBC AUTO: 78.1 FL — LOW (ref 80–100)
NONHDLC SERPL-MCNC: 102 MG/DL — SIGNIFICANT CHANGE UP
NRBC # BLD AUTO: 0 K/UL — SIGNIFICANT CHANGE UP (ref 0–0)
NRBC # FLD: 0 K/UL — SIGNIFICANT CHANGE UP (ref 0–0)
NRBC BLD AUTO-RTO: 0 /100 WBCS — SIGNIFICANT CHANGE UP (ref 0–0)
NT-PROBNP SERPL-SCNC: 2145 PG/ML — HIGH (ref 0–300)
OSMOLALITY SERPL: 288 MOSMOL/KG — SIGNIFICANT CHANGE UP (ref 275–300)
OSMOLALITY UR: 367 MOS/KG — SIGNIFICANT CHANGE UP (ref 300–900)
PHOSPHATE SERPL-MCNC: 3.1 MG/DL — SIGNIFICANT CHANGE UP (ref 2.5–4.5)
PLATELET # BLD AUTO: 359 K/UL — SIGNIFICANT CHANGE UP (ref 150–400)
PMV BLD: 11 FL — SIGNIFICANT CHANGE UP (ref 7–13)
POTASSIUM SERPL-MCNC: 3.8 MMOL/L — SIGNIFICANT CHANGE UP (ref 3.5–5.3)
POTASSIUM SERPL-SCNC: 3.8 MMOL/L — SIGNIFICANT CHANGE UP (ref 3.5–5.3)
RBC # BLD: 4.57 M/UL — SIGNIFICANT CHANGE UP (ref 3.8–5.2)
RBC # FLD: 17.6 % — HIGH (ref 10.3–14.5)
SODIUM SERPL-SCNC: 132 MMOL/L — LOW (ref 135–145)
TRIGL SERPL-MCNC: 192 MG/DL — HIGH
WBC # BLD: 7.29 K/UL — SIGNIFICANT CHANGE UP (ref 3.8–10.5)
WBC # FLD AUTO: 7.29 K/UL — SIGNIFICANT CHANGE UP (ref 3.8–10.5)

## 2025-07-15 PROCEDURE — 82947 ASSAY GLUCOSE BLOOD QUANT: CPT

## 2025-07-15 PROCEDURE — 82803 BLOOD GASES ANY COMBINATION: CPT

## 2025-07-15 PROCEDURE — 99233 SBSQ HOSP IP/OBS HIGH 50: CPT | Mod: GC

## 2025-07-15 PROCEDURE — 85025 COMPLETE CBC W/AUTO DIFF WBC: CPT

## 2025-07-15 PROCEDURE — 86901 BLOOD TYPING SEROLOGIC RH(D): CPT

## 2025-07-15 PROCEDURE — 86900 BLOOD TYPING SEROLOGIC ABO: CPT

## 2025-07-15 PROCEDURE — 82330 ASSAY OF CALCIUM: CPT

## 2025-07-15 PROCEDURE — 82962 GLUCOSE BLOOD TEST: CPT

## 2025-07-15 PROCEDURE — 84484 ASSAY OF TROPONIN QUANT: CPT

## 2025-07-15 PROCEDURE — 83605 ASSAY OF LACTIC ACID: CPT

## 2025-07-15 PROCEDURE — 36415 COLL VENOUS BLD VENIPUNCTURE: CPT

## 2025-07-15 PROCEDURE — 85730 THROMBOPLASTIN TIME PARTIAL: CPT

## 2025-07-15 PROCEDURE — 82435 ASSAY OF BLOOD CHLORIDE: CPT

## 2025-07-15 PROCEDURE — 99233 SBSQ HOSP IP/OBS HIGH 50: CPT

## 2025-07-15 PROCEDURE — 85610 PROTHROMBIN TIME: CPT

## 2025-07-15 PROCEDURE — 80061 LIPID PANEL: CPT

## 2025-07-15 PROCEDURE — A9585: CPT

## 2025-07-15 PROCEDURE — 84132 ASSAY OF SERUM POTASSIUM: CPT

## 2025-07-15 PROCEDURE — 84295 ASSAY OF SERUM SODIUM: CPT

## 2025-07-15 PROCEDURE — 83930 ASSAY OF BLOOD OSMOLALITY: CPT

## 2025-07-15 PROCEDURE — 74177 CT ABD & PELVIS W/CONTRAST: CPT | Mod: 26

## 2025-07-15 PROCEDURE — 93005 ELECTROCARDIOGRAM TRACING: CPT

## 2025-07-15 PROCEDURE — 83880 ASSAY OF NATRIURETIC PEPTIDE: CPT

## 2025-07-15 PROCEDURE — 85018 HEMOGLOBIN: CPT

## 2025-07-15 PROCEDURE — C8929: CPT

## 2025-07-15 PROCEDURE — 99255 IP/OBS CONSLTJ NEW/EST HI 80: CPT | Mod: GC

## 2025-07-15 PROCEDURE — 86146 BETA-2 GLYCOPROTEIN ANTIBODY: CPT

## 2025-07-15 PROCEDURE — 83036 HEMOGLOBIN GLYCOSYLATED A1C: CPT

## 2025-07-15 PROCEDURE — 84100 ASSAY OF PHOSPHORUS: CPT

## 2025-07-15 PROCEDURE — 83735 ASSAY OF MAGNESIUM: CPT

## 2025-07-15 PROCEDURE — 75561 CARDIAC MRI FOR MORPH W/DYE: CPT | Mod: 26

## 2025-07-15 PROCEDURE — 85613 RUSSELL VIPER VENOM DILUTED: CPT

## 2025-07-15 PROCEDURE — 85027 COMPLETE CBC AUTOMATED: CPT

## 2025-07-15 PROCEDURE — 80048 BASIC METABOLIC PNL TOTAL CA: CPT

## 2025-07-15 PROCEDURE — 85014 HEMATOCRIT: CPT

## 2025-07-15 PROCEDURE — 86850 RBC ANTIBODY SCREEN: CPT

## 2025-07-15 PROCEDURE — 74177 CT ABD & PELVIS W/CONTRAST: CPT

## 2025-07-15 PROCEDURE — 83935 ASSAY OF URINE OSMOLALITY: CPT

## 2025-07-15 PROCEDURE — 75561 CARDIAC MRI FOR MORPH W/DYE: CPT

## 2025-07-15 PROCEDURE — 93308 TTE F-UP OR LMTD: CPT

## 2025-07-15 PROCEDURE — 80053 COMPREHEN METABOLIC PANEL: CPT

## 2025-07-15 PROCEDURE — 86147 CARDIOLIPIN ANTIBODY EA IG: CPT

## 2025-07-15 RX ORDER — LORAZEPAM 4 MG/ML
1 VIAL (ML) INJECTION ONCE
Refills: 0 | Status: DISCONTINUED | OUTPATIENT
Start: 2025-07-15 | End: 2025-07-15

## 2025-07-15 RX ADMIN — WHITE PETROLATUM 1 APPLICATION(S): 1 OINTMENT TOPICAL at 22:03

## 2025-07-15 RX ADMIN — Medication 81 MILLIGRAM(S): at 13:02

## 2025-07-15 RX ADMIN — Medication 5 MILLIGRAM(S): at 21:58

## 2025-07-15 RX ADMIN — ATORVASTATIN CALCIUM 10 MILLIGRAM(S): 80 TABLET, FILM COATED ORAL at 21:57

## 2025-07-15 RX ADMIN — INSULIN LISPRO 1: 100 INJECTION, SOLUTION INTRAVENOUS; SUBCUTANEOUS at 17:32

## 2025-07-15 RX ADMIN — HEPARIN SODIUM 2000 UNIT(S)/HR: 1000 INJECTION INTRAVENOUS; SUBCUTANEOUS at 20:52

## 2025-07-15 RX ADMIN — INSULIN LISPRO 1: 100 INJECTION, SOLUTION INTRAVENOUS; SUBCUTANEOUS at 07:55

## 2025-07-15 RX ADMIN — Medication 1 MILLIGRAM(S): at 10:12

## 2025-07-15 RX ADMIN — INSULIN GLARGINE-YFGN 15 UNIT(S): 100 INJECTION, SOLUTION SUBCUTANEOUS at 21:58

## 2025-07-15 RX ADMIN — HEPARIN SODIUM 2000 UNIT(S)/HR: 1000 INJECTION INTRAVENOUS; SUBCUTANEOUS at 07:33

## 2025-07-15 RX ADMIN — WHITE PETROLATUM 1 APPLICATION(S): 1 OINTMENT TOPICAL at 13:09

## 2025-07-15 RX ADMIN — WHITE PETROLATUM 1 APPLICATION(S): 1 OINTMENT TOPICAL at 05:24

## 2025-07-15 RX ADMIN — INSULIN LISPRO 1: 100 INJECTION, SOLUTION INTRAVENOUS; SUBCUTANEOUS at 13:02

## 2025-07-16 ENCOUNTER — RESULT REVIEW (OUTPATIENT)
Age: 45
End: 2025-07-16

## 2025-07-16 DIAGNOSIS — K63.89 OTHER SPECIFIED DISEASES OF INTESTINE: ICD-10-CM

## 2025-07-16 DIAGNOSIS — I51.3 INTRACARDIAC THROMBOSIS, NOT ELSEWHERE CLASSIFIED: ICD-10-CM

## 2025-07-16 DIAGNOSIS — I50.20 UNSPECIFIED SYSTOLIC (CONGESTIVE) HEART FAILURE: ICD-10-CM

## 2025-07-16 LAB
ANION GAP SERPL CALC-SCNC: 16 MMOL/L — SIGNIFICANT CHANGE UP (ref 5–17)
APTT BLD: 63 SEC — HIGH (ref 26.1–36.8)
B2 GLYCOPROT1 IGA SER QL: <2 U/ML — SIGNIFICANT CHANGE UP
B2 GLYCOPROT1 IGG SER-ACNC: <1.4 U/ML — SIGNIFICANT CHANGE UP
B2 GLYCOPROT1 IGM SER-ACNC: <1.5 U/ML — SIGNIFICANT CHANGE UP
BUN SERPL-MCNC: 12 MG/DL — SIGNIFICANT CHANGE UP (ref 7–23)
CALCIUM SERPL-MCNC: 9.6 MG/DL — SIGNIFICANT CHANGE UP (ref 8.4–10.5)
CARDIOLIPIN IGM SER-MCNC: <1.5 MPL U/ML — SIGNIFICANT CHANGE UP
CARDIOLIPIN IGM SER-MCNC: <1.6 GPL U/ML — SIGNIFICANT CHANGE UP
CHLORIDE SERPL-SCNC: 100 MMOL/L — SIGNIFICANT CHANGE UP (ref 96–108)
CO2 SERPL-SCNC: 20 MMOL/L — LOW (ref 22–31)
CREAT SERPL-MCNC: 0.87 MG/DL — SIGNIFICANT CHANGE UP (ref 0.5–1.3)
DEPRECATED CARDIOLIPIN IGA SER: <2 APL U/ML — SIGNIFICANT CHANGE UP
DRVVT RATIO: 0.95 RATIO — SIGNIFICANT CHANGE UP (ref 0–1.21)
DRVVT SCREEN TO CONFIRM RATIO: SIGNIFICANT CHANGE UP
EGFR: 84 ML/MIN/1.73M2 — SIGNIFICANT CHANGE UP
EGFR: 84 ML/MIN/1.73M2 — SIGNIFICANT CHANGE UP
GLUCOSE BLDC GLUCOMTR-MCNC: 147 MG/DL — HIGH (ref 70–99)
GLUCOSE BLDC GLUCOMTR-MCNC: 168 MG/DL — HIGH (ref 70–99)
GLUCOSE BLDC GLUCOMTR-MCNC: 223 MG/DL — HIGH (ref 70–99)
GLUCOSE BLDC GLUCOMTR-MCNC: 259 MG/DL — HIGH (ref 70–99)
GLUCOSE BLDC GLUCOMTR-MCNC: 350 MG/DL — HIGH (ref 70–99)
GLUCOSE SERPL-MCNC: 155 MG/DL — HIGH (ref 70–99)
HCT VFR BLD CALC: 35.4 % — SIGNIFICANT CHANGE UP (ref 34.5–45)
HGB BLD-MCNC: 10.9 G/DL — LOW (ref 11.5–15.5)
INR BLD: 1.06 RATIO — SIGNIFICANT CHANGE UP (ref 0.85–1.16)
MAGNESIUM SERPL-MCNC: 1.6 MG/DL — SIGNIFICANT CHANGE UP (ref 1.6–2.6)
MCHC RBC-ENTMCNC: 23.8 PG — LOW (ref 27–34)
MCHC RBC-ENTMCNC: 30.8 G/DL — LOW (ref 32–36)
MCV RBC AUTO: 77.3 FL — LOW (ref 80–100)
NORMALIZED SCT PPP-RTO: 0.92 RATIO — SIGNIFICANT CHANGE UP (ref 0–1.16)
NORMALIZED SCT PPP-RTO: SIGNIFICANT CHANGE UP
NRBC # BLD AUTO: 0.03 K/UL — HIGH (ref 0–0)
NRBC # FLD: 0.03 K/UL — HIGH (ref 0–0)
NRBC BLD AUTO-RTO: 0 /100 WBCS — SIGNIFICANT CHANGE UP (ref 0–0)
PHOSPHATE SERPL-MCNC: 2.9 MG/DL — SIGNIFICANT CHANGE UP (ref 2.5–4.5)
PLATELET # BLD AUTO: 367 K/UL — SIGNIFICANT CHANGE UP (ref 150–400)
PMV BLD: 10.4 FL — SIGNIFICANT CHANGE UP (ref 7–13)
POTASSIUM SERPL-MCNC: 3.7 MMOL/L — SIGNIFICANT CHANGE UP (ref 3.5–5.3)
POTASSIUM SERPL-SCNC: 3.7 MMOL/L — SIGNIFICANT CHANGE UP (ref 3.5–5.3)
PROTHROM AB SERPL-ACNC: 12.2 SEC — SIGNIFICANT CHANGE UP (ref 9.9–13.4)
RBC # BLD: 4.58 M/UL — SIGNIFICANT CHANGE UP (ref 3.8–5.2)
RBC # FLD: 17.3 % — HIGH (ref 10.3–14.5)
SODIUM SERPL-SCNC: 136 MMOL/L — SIGNIFICANT CHANGE UP (ref 135–145)
WBC # BLD: 6.61 K/UL — SIGNIFICANT CHANGE UP (ref 3.8–10.5)
WBC # FLD AUTO: 6.61 K/UL — SIGNIFICANT CHANGE UP (ref 3.8–10.5)

## 2025-07-16 PROCEDURE — 93308 TTE F-UP OR LMTD: CPT | Mod: 26

## 2025-07-16 PROCEDURE — 99233 SBSQ HOSP IP/OBS HIGH 50: CPT | Mod: GC

## 2025-07-16 PROCEDURE — 99233 SBSQ HOSP IP/OBS HIGH 50: CPT

## 2025-07-16 RX ORDER — METOPROLOL SUCCINATE 50 MG/1
2.5 TABLET, EXTENDED RELEASE ORAL ONCE
Refills: 0 | Status: DISCONTINUED | OUTPATIENT
Start: 2025-07-16 | End: 2025-07-16

## 2025-07-16 RX ORDER — METOPROLOL SUCCINATE 50 MG/1
50 TABLET, EXTENDED RELEASE ORAL DAILY
Refills: 0 | Status: DISCONTINUED | OUTPATIENT
Start: 2025-07-16 | End: 2025-07-16

## 2025-07-16 RX ORDER — METOPROLOL SUCCINATE 50 MG/1
25 TABLET, EXTENDED RELEASE ORAL ONCE
Refills: 0 | Status: DISCONTINUED | OUTPATIENT
Start: 2025-07-16 | End: 2025-07-16

## 2025-07-16 RX ORDER — METOPROLOL SUCCINATE 50 MG/1
25 TABLET, EXTENDED RELEASE ORAL ONCE
Refills: 0 | Status: COMPLETED | OUTPATIENT
Start: 2025-07-17 | End: 2025-07-17

## 2025-07-16 RX ADMIN — WHITE PETROLATUM 1 APPLICATION(S): 1 OINTMENT TOPICAL at 21:23

## 2025-07-16 RX ADMIN — INSULIN LISPRO 2: 100 INJECTION, SOLUTION INTRAVENOUS; SUBCUTANEOUS at 17:52

## 2025-07-16 RX ADMIN — HEPARIN SODIUM 2000 UNIT(S)/HR: 1000 INJECTION INTRAVENOUS; SUBCUTANEOUS at 17:45

## 2025-07-16 RX ADMIN — ATORVASTATIN CALCIUM 10 MILLIGRAM(S): 80 TABLET, FILM COATED ORAL at 21:03

## 2025-07-16 RX ADMIN — WHITE PETROLATUM 1 APPLICATION(S): 1 OINTMENT TOPICAL at 05:28

## 2025-07-16 RX ADMIN — METOPROLOL SUCCINATE 50 MILLIGRAM(S): 50 TABLET, EXTENDED RELEASE ORAL at 12:09

## 2025-07-16 RX ADMIN — INSULIN GLARGINE-YFGN 15 UNIT(S): 100 INJECTION, SOLUTION SUBCUTANEOUS at 21:30

## 2025-07-16 RX ADMIN — HEPARIN SODIUM 2000 UNIT(S)/HR: 1000 INJECTION INTRAVENOUS; SUBCUTANEOUS at 05:14

## 2025-07-16 RX ADMIN — WHITE PETROLATUM 1 APPLICATION(S): 1 OINTMENT TOPICAL at 12:24

## 2025-07-16 RX ADMIN — INSULIN LISPRO 3: 100 INJECTION, SOLUTION INTRAVENOUS; SUBCUTANEOUS at 12:10

## 2025-07-16 RX ADMIN — Medication 5 MILLIGRAM(S): at 21:03

## 2025-07-16 RX ADMIN — HEPARIN SODIUM 2000 UNIT(S)/HR: 1000 INJECTION INTRAVENOUS; SUBCUTANEOUS at 05:47

## 2025-07-16 RX ADMIN — HEPARIN SODIUM 2000 UNIT(S)/HR: 1000 INJECTION INTRAVENOUS; SUBCUTANEOUS at 05:28

## 2025-07-16 RX ADMIN — INSULIN LISPRO 2: 100 INJECTION, SOLUTION INTRAVENOUS; SUBCUTANEOUS at 21:30

## 2025-07-16 RX ADMIN — Medication 81 MILLIGRAM(S): at 12:10

## 2025-07-17 LAB
ANION GAP SERPL CALC-SCNC: 17 MMOL/L — SIGNIFICANT CHANGE UP (ref 5–17)
APTT BLD: 52.8 SEC — HIGH (ref 26.1–36.8)
APTT BLD: 76.6 SEC — HIGH (ref 26.1–36.8)
APTT BLD: 86.6 SEC — HIGH (ref 26.1–36.8)
BUN SERPL-MCNC: 12 MG/DL — SIGNIFICANT CHANGE UP (ref 7–23)
CALCIUM SERPL-MCNC: 9.6 MG/DL — SIGNIFICANT CHANGE UP (ref 8.4–10.5)
CHLORIDE SERPL-SCNC: 99 MMOL/L — SIGNIFICANT CHANGE UP (ref 96–108)
CO2 SERPL-SCNC: 19 MMOL/L — LOW (ref 22–31)
CREAT SERPL-MCNC: 0.75 MG/DL — SIGNIFICANT CHANGE UP (ref 0.5–1.3)
DNA PLOIDY SPEC FC-IMP: SIGNIFICANT CHANGE UP
EGFR: 100 ML/MIN/1.73M2 — SIGNIFICANT CHANGE UP
EGFR: 100 ML/MIN/1.73M2 — SIGNIFICANT CHANGE UP
GLUCOSE BLDC GLUCOMTR-MCNC: 114 MG/DL — HIGH (ref 70–99)
GLUCOSE BLDC GLUCOMTR-MCNC: 159 MG/DL — HIGH (ref 70–99)
GLUCOSE BLDC GLUCOMTR-MCNC: 179 MG/DL — HIGH (ref 70–99)
GLUCOSE BLDC GLUCOMTR-MCNC: 237 MG/DL — HIGH (ref 70–99)
GLUCOSE SERPL-MCNC: 158 MG/DL — HIGH (ref 70–99)
HCT VFR BLD CALC: 34.3 % — LOW (ref 34.5–45)
HGB BLD-MCNC: 10.4 G/DL — LOW (ref 11.5–15.5)
INR BLD: 1.07 RATIO — SIGNIFICANT CHANGE UP (ref 0.85–1.16)
MAGNESIUM SERPL-MCNC: 1.7 MG/DL — SIGNIFICANT CHANGE UP (ref 1.6–2.6)
MCHC RBC-ENTMCNC: 23.7 PG — LOW (ref 27–34)
MCHC RBC-ENTMCNC: 30.3 G/DL — LOW (ref 32–36)
MCV RBC AUTO: 78.3 FL — LOW (ref 80–100)
NRBC # BLD AUTO: 0 K/UL — SIGNIFICANT CHANGE UP (ref 0–0)
NRBC # FLD: 0 K/UL — SIGNIFICANT CHANGE UP (ref 0–0)
NRBC BLD AUTO-RTO: 0 /100 WBCS — SIGNIFICANT CHANGE UP (ref 0–0)
PHOSPHATE SERPL-MCNC: 3 MG/DL — SIGNIFICANT CHANGE UP (ref 2.5–4.5)
PLATELET # BLD AUTO: 391 K/UL — SIGNIFICANT CHANGE UP (ref 150–400)
PMV BLD: 11.1 FL — SIGNIFICANT CHANGE UP (ref 7–13)
POTASSIUM SERPL-MCNC: 3.8 MMOL/L — SIGNIFICANT CHANGE UP (ref 3.5–5.3)
POTASSIUM SERPL-SCNC: 3.8 MMOL/L — SIGNIFICANT CHANGE UP (ref 3.5–5.3)
PROTHROM AB SERPL-ACNC: 12.3 SEC — SIGNIFICANT CHANGE UP (ref 9.9–13.4)
PTR INTERPRETATION: SIGNIFICANT CHANGE UP
RBC # BLD: 4.38 M/UL — SIGNIFICANT CHANGE UP (ref 3.8–5.2)
RBC # FLD: 17.2 % — HIGH (ref 10.3–14.5)
SODIUM SERPL-SCNC: 135 MMOL/L — SIGNIFICANT CHANGE UP (ref 135–145)
WBC # BLD: 5.39 K/UL — SIGNIFICANT CHANGE UP (ref 3.8–10.5)
WBC # FLD AUTO: 5.39 K/UL — SIGNIFICANT CHANGE UP (ref 3.8–10.5)

## 2025-07-17 PROCEDURE — 83930 ASSAY OF BLOOD OSMOLALITY: CPT

## 2025-07-17 PROCEDURE — 83935 ASSAY OF URINE OSMOLALITY: CPT

## 2025-07-17 PROCEDURE — 75561 CARDIAC MRI FOR MORPH W/DYE: CPT

## 2025-07-17 PROCEDURE — C8929: CPT

## 2025-07-17 PROCEDURE — 80053 COMPREHEN METABOLIC PANEL: CPT

## 2025-07-17 PROCEDURE — 82947 ASSAY GLUCOSE BLOOD QUANT: CPT

## 2025-07-17 PROCEDURE — 82803 BLOOD GASES ANY COMBINATION: CPT

## 2025-07-17 PROCEDURE — 93005 ELECTROCARDIOGRAM TRACING: CPT

## 2025-07-17 PROCEDURE — 84295 ASSAY OF SERUM SODIUM: CPT

## 2025-07-17 PROCEDURE — 84132 ASSAY OF SERUM POTASSIUM: CPT

## 2025-07-17 PROCEDURE — 85018 HEMOGLOBIN: CPT

## 2025-07-17 PROCEDURE — 80061 LIPID PANEL: CPT

## 2025-07-17 PROCEDURE — A9585: CPT

## 2025-07-17 PROCEDURE — 84484 ASSAY OF TROPONIN QUANT: CPT

## 2025-07-17 PROCEDURE — 86850 RBC ANTIBODY SCREEN: CPT

## 2025-07-17 PROCEDURE — 85610 PROTHROMBIN TIME: CPT

## 2025-07-17 PROCEDURE — 74177 CT ABD & PELVIS W/CONTRAST: CPT

## 2025-07-17 PROCEDURE — 83605 ASSAY OF LACTIC ACID: CPT

## 2025-07-17 PROCEDURE — 80048 BASIC METABOLIC PNL TOTAL CA: CPT

## 2025-07-17 PROCEDURE — 83880 ASSAY OF NATRIURETIC PEPTIDE: CPT

## 2025-07-17 PROCEDURE — 81240 F2 GENE: CPT

## 2025-07-17 PROCEDURE — 86901 BLOOD TYPING SEROLOGIC RH(D): CPT

## 2025-07-17 PROCEDURE — 86147 CARDIOLIPIN ANTIBODY EA IG: CPT

## 2025-07-17 PROCEDURE — 84100 ASSAY OF PHOSPHORUS: CPT

## 2025-07-17 PROCEDURE — 99233 SBSQ HOSP IP/OBS HIGH 50: CPT | Mod: GC

## 2025-07-17 PROCEDURE — 85730 THROMBOPLASTIN TIME PARTIAL: CPT

## 2025-07-17 PROCEDURE — 74177 CT ABD & PELVIS W/CONTRAST: CPT | Mod: 26

## 2025-07-17 PROCEDURE — 85025 COMPLETE CBC W/AUTO DIFF WBC: CPT

## 2025-07-17 PROCEDURE — 99233 SBSQ HOSP IP/OBS HIGH 50: CPT

## 2025-07-17 PROCEDURE — 85014 HEMATOCRIT: CPT

## 2025-07-17 PROCEDURE — 85613 RUSSELL VIPER VENOM DILUTED: CPT

## 2025-07-17 PROCEDURE — 82330 ASSAY OF CALCIUM: CPT

## 2025-07-17 PROCEDURE — 36415 COLL VENOUS BLD VENIPUNCTURE: CPT

## 2025-07-17 PROCEDURE — 83036 HEMOGLOBIN GLYCOSYLATED A1C: CPT

## 2025-07-17 PROCEDURE — 86900 BLOOD TYPING SEROLOGIC ABO: CPT

## 2025-07-17 PROCEDURE — 81241 F5 GENE: CPT

## 2025-07-17 PROCEDURE — 82962 GLUCOSE BLOOD TEST: CPT

## 2025-07-17 PROCEDURE — 85027 COMPLETE CBC AUTOMATED: CPT

## 2025-07-17 PROCEDURE — 86146 BETA-2 GLYCOPROTEIN ANTIBODY: CPT

## 2025-07-17 PROCEDURE — 82435 ASSAY OF BLOOD CHLORIDE: CPT

## 2025-07-17 PROCEDURE — 93308 TTE F-UP OR LMTD: CPT

## 2025-07-17 PROCEDURE — 83735 ASSAY OF MAGNESIUM: CPT

## 2025-07-17 RX ORDER — DEXTROSE 50 % IN WATER 50 %
12.5 SYRINGE (ML) INTRAVENOUS ONCE
Refills: 0 | Status: DISCONTINUED | OUTPATIENT
Start: 2025-07-17 | End: 2025-07-21

## 2025-07-17 RX ORDER — METOPROLOL SUCCINATE 50 MG/1
50 TABLET, EXTENDED RELEASE ORAL ONCE
Refills: 0 | Status: COMPLETED | OUTPATIENT
Start: 2025-07-17 | End: 2025-07-17

## 2025-07-17 RX ORDER — MAGNESIUM SULFATE 500 MG/ML
2 SYRINGE (ML) INJECTION ONCE
Refills: 0 | Status: COMPLETED | OUTPATIENT
Start: 2025-07-17 | End: 2025-07-17

## 2025-07-17 RX ORDER — METOPROLOL SUCCINATE 50 MG/1
25 TABLET, EXTENDED RELEASE ORAL ONCE
Refills: 0 | Status: COMPLETED | OUTPATIENT
Start: 2025-07-17 | End: 2025-07-17

## 2025-07-17 RX ORDER — DEXTROSE 50 % IN WATER 50 %
15 SYRINGE (ML) INTRAVENOUS ONCE
Refills: 0 | Status: DISCONTINUED | OUTPATIENT
Start: 2025-07-17 | End: 2025-07-21

## 2025-07-17 RX ORDER — SODIUM CHLORIDE 9 G/1000ML
1000 INJECTION, SOLUTION INTRAVENOUS
Refills: 0 | Status: DISCONTINUED | OUTPATIENT
Start: 2025-07-17 | End: 2025-07-21

## 2025-07-17 RX ORDER — GLUCAGON 3 MG/1
1 POWDER NASAL ONCE
Refills: 0 | Status: DISCONTINUED | OUTPATIENT
Start: 2025-07-17 | End: 2025-07-21

## 2025-07-17 RX ORDER — DEXTROSE 50 % IN WATER 50 %
25 SYRINGE (ML) INTRAVENOUS ONCE
Refills: 0 | Status: DISCONTINUED | OUTPATIENT
Start: 2025-07-17 | End: 2025-07-21

## 2025-07-17 RX ORDER — DEXTROSE 50 % IN WATER 50 %
25 SYRINGE (ML) INTRAVENOUS ONCE
Refills: 0 | Status: DISCONTINUED | OUTPATIENT
Start: 2025-07-17 | End: 2025-07-17

## 2025-07-17 RX ORDER — INSULIN GLARGINE-YFGN 100 [IU]/ML
17 INJECTION, SOLUTION SUBCUTANEOUS AT BEDTIME
Refills: 0 | Status: DISCONTINUED | OUTPATIENT
Start: 2025-07-17 | End: 2025-07-17

## 2025-07-17 RX ORDER — INSULIN LISPRO 100 U/ML
INJECTION, SOLUTION INTRAVENOUS; SUBCUTANEOUS
Refills: 0 | Status: DISCONTINUED | OUTPATIENT
Start: 2025-07-17 | End: 2025-07-21

## 2025-07-17 RX ORDER — INSULIN GLARGINE-YFGN 100 [IU]/ML
17 INJECTION, SOLUTION SUBCUTANEOUS AT BEDTIME
Refills: 0 | Status: DISCONTINUED | OUTPATIENT
Start: 2025-07-17 | End: 2025-07-21

## 2025-07-17 RX ORDER — INSULIN LISPRO 100 U/ML
2 INJECTION, SOLUTION INTRAVENOUS; SUBCUTANEOUS
Refills: 0 | Status: DISCONTINUED | OUTPATIENT
Start: 2025-07-17 | End: 2025-07-21

## 2025-07-17 RX ADMIN — WHITE PETROLATUM 1 APPLICATION(S): 1 OINTMENT TOPICAL at 12:49

## 2025-07-17 RX ADMIN — HEPARIN SODIUM 2200 UNIT(S)/HR: 1000 INJECTION INTRAVENOUS; SUBCUTANEOUS at 16:39

## 2025-07-17 RX ADMIN — INSULIN LISPRO 1: 100 INJECTION, SOLUTION INTRAVENOUS; SUBCUTANEOUS at 07:47

## 2025-07-17 RX ADMIN — HEPARIN SODIUM 2200 UNIT(S)/HR: 1000 INJECTION INTRAVENOUS; SUBCUTANEOUS at 23:04

## 2025-07-17 RX ADMIN — HEPARIN SODIUM 2200 UNIT(S)/HR: 1000 INJECTION INTRAVENOUS; SUBCUTANEOUS at 07:47

## 2025-07-17 RX ADMIN — METOPROLOL SUCCINATE 25 MILLIGRAM(S): 50 TABLET, EXTENDED RELEASE ORAL at 05:29

## 2025-07-17 RX ADMIN — INSULIN LISPRO 1: 100 INJECTION, SOLUTION INTRAVENOUS; SUBCUTANEOUS at 17:31

## 2025-07-17 RX ADMIN — Medication 25 GRAM(S): at 09:20

## 2025-07-17 RX ADMIN — WHITE PETROLATUM 1 APPLICATION(S): 1 OINTMENT TOPICAL at 21:30

## 2025-07-17 RX ADMIN — INSULIN GLARGINE-YFGN 17 UNIT(S): 100 INJECTION, SOLUTION SUBCUTANEOUS at 21:44

## 2025-07-17 RX ADMIN — INSULIN LISPRO 2 UNIT(S): 100 INJECTION, SOLUTION INTRAVENOUS; SUBCUTANEOUS at 17:31

## 2025-07-17 RX ADMIN — WHITE PETROLATUM 1 APPLICATION(S): 1 OINTMENT TOPICAL at 05:40

## 2025-07-17 RX ADMIN — METOPROLOL SUCCINATE 50 MILLIGRAM(S): 50 TABLET, EXTENDED RELEASE ORAL at 14:30

## 2025-07-17 RX ADMIN — METOPROLOL SUCCINATE 25 MILLIGRAM(S): 50 TABLET, EXTENDED RELEASE ORAL at 07:46

## 2025-07-17 RX ADMIN — INSULIN LISPRO 2 UNIT(S): 100 INJECTION, SOLUTION INTRAVENOUS; SUBCUTANEOUS at 12:23

## 2025-07-17 RX ADMIN — INSULIN LISPRO 2: 100 INJECTION, SOLUTION INTRAVENOUS; SUBCUTANEOUS at 12:24

## 2025-07-17 RX ADMIN — Medication 81 MILLIGRAM(S): at 12:24

## 2025-07-17 RX ADMIN — Medication 10 MILLIGRAM(S): at 21:45

## 2025-07-17 RX ADMIN — ATORVASTATIN CALCIUM 10 MILLIGRAM(S): 80 TABLET, FILM COATED ORAL at 21:45

## 2025-07-18 LAB
ANION GAP SERPL CALC-SCNC: 15 MMOL/L — SIGNIFICANT CHANGE UP (ref 5–17)
APTT BLD: 76.4 SEC — HIGH (ref 26.1–36.8)
BUN SERPL-MCNC: 6 MG/DL — LOW (ref 7–23)
CALCIUM SERPL-MCNC: 9.7 MG/DL — SIGNIFICANT CHANGE UP (ref 8.4–10.5)
CHLORIDE SERPL-SCNC: 102 MMOL/L — SIGNIFICANT CHANGE UP (ref 96–108)
CO2 SERPL-SCNC: 21 MMOL/L — LOW (ref 22–31)
CREAT SERPL-MCNC: 0.69 MG/DL — SIGNIFICANT CHANGE UP (ref 0.5–1.3)
EGFR: 109 ML/MIN/1.73M2 — SIGNIFICANT CHANGE UP
EGFR: 109 ML/MIN/1.73M2 — SIGNIFICANT CHANGE UP
GLUCOSE BLDC GLUCOMTR-MCNC: 150 MG/DL — HIGH (ref 70–99)
GLUCOSE BLDC GLUCOMTR-MCNC: 188 MG/DL — HIGH (ref 70–99)
GLUCOSE BLDC GLUCOMTR-MCNC: 194 MG/DL — HIGH (ref 70–99)
GLUCOSE BLDC GLUCOMTR-MCNC: 196 MG/DL — HIGH (ref 70–99)
GLUCOSE SERPL-MCNC: 128 MG/DL — HIGH (ref 70–99)
HCT VFR BLD CALC: 33.7 % — LOW (ref 34.5–45)
HGB BLD-MCNC: 10.4 G/DL — LOW (ref 11.5–15.5)
INR BLD: 1.14 RATIO — SIGNIFICANT CHANGE UP (ref 0.85–1.16)
MCHC RBC-ENTMCNC: 24.1 PG — LOW (ref 27–34)
MCHC RBC-ENTMCNC: 30.9 G/DL — LOW (ref 32–36)
MCV RBC AUTO: 78 FL — LOW (ref 80–100)
NRBC # BLD AUTO: 0.02 K/UL — HIGH (ref 0–0)
NRBC # FLD: 0.02 K/UL — HIGH (ref 0–0)
NRBC BLD AUTO-RTO: 0 /100 WBCS — SIGNIFICANT CHANGE UP (ref 0–0)
PLATELET # BLD AUTO: 402 K/UL — HIGH (ref 150–400)
PMV BLD: 10.6 FL — SIGNIFICANT CHANGE UP (ref 7–13)
POTASSIUM SERPL-MCNC: 3.5 MMOL/L — SIGNIFICANT CHANGE UP (ref 3.5–5.3)
POTASSIUM SERPL-SCNC: 3.5 MMOL/L — SIGNIFICANT CHANGE UP (ref 3.5–5.3)
PROTHROM AB SERPL-ACNC: 13.1 SEC — SIGNIFICANT CHANGE UP (ref 9.9–13.4)
RBC # BLD: 4.32 M/UL — SIGNIFICANT CHANGE UP (ref 3.8–5.2)
RBC # FLD: 17.2 % — HIGH (ref 10.3–14.5)
SODIUM SERPL-SCNC: 138 MMOL/L — SIGNIFICANT CHANGE UP (ref 135–145)
WBC # BLD: 5.59 K/UL — SIGNIFICANT CHANGE UP (ref 3.8–10.5)
WBC # FLD AUTO: 5.59 K/UL — SIGNIFICANT CHANGE UP (ref 3.8–10.5)

## 2025-07-18 PROCEDURE — 99233 SBSQ HOSP IP/OBS HIGH 50: CPT

## 2025-07-18 PROCEDURE — 86146 BETA-2 GLYCOPROTEIN ANTIBODY: CPT

## 2025-07-18 PROCEDURE — 85027 COMPLETE CBC AUTOMATED: CPT

## 2025-07-18 PROCEDURE — 82962 GLUCOSE BLOOD TEST: CPT

## 2025-07-18 PROCEDURE — 74177 CT ABD & PELVIS W/CONTRAST: CPT

## 2025-07-18 PROCEDURE — 85610 PROTHROMBIN TIME: CPT

## 2025-07-18 PROCEDURE — 85730 THROMBOPLASTIN TIME PARTIAL: CPT

## 2025-07-18 PROCEDURE — 80061 LIPID PANEL: CPT

## 2025-07-18 PROCEDURE — 84132 ASSAY OF SERUM POTASSIUM: CPT

## 2025-07-18 PROCEDURE — 81241 F5 GENE: CPT

## 2025-07-18 PROCEDURE — 83735 ASSAY OF MAGNESIUM: CPT

## 2025-07-18 PROCEDURE — 81240 F2 GENE: CPT

## 2025-07-18 PROCEDURE — 93005 ELECTROCARDIOGRAM TRACING: CPT

## 2025-07-18 PROCEDURE — 85025 COMPLETE CBC W/AUTO DIFF WBC: CPT

## 2025-07-18 PROCEDURE — 85613 RUSSELL VIPER VENOM DILUTED: CPT

## 2025-07-18 PROCEDURE — 85018 HEMOGLOBIN: CPT

## 2025-07-18 PROCEDURE — 36415 COLL VENOUS BLD VENIPUNCTURE: CPT

## 2025-07-18 PROCEDURE — 93308 TTE F-UP OR LMTD: CPT

## 2025-07-18 PROCEDURE — 80053 COMPREHEN METABOLIC PANEL: CPT

## 2025-07-18 PROCEDURE — 85014 HEMATOCRIT: CPT

## 2025-07-18 PROCEDURE — 83880 ASSAY OF NATRIURETIC PEPTIDE: CPT

## 2025-07-18 PROCEDURE — 83935 ASSAY OF URINE OSMOLALITY: CPT

## 2025-07-18 PROCEDURE — 86901 BLOOD TYPING SEROLOGIC RH(D): CPT

## 2025-07-18 PROCEDURE — 82947 ASSAY GLUCOSE BLOOD QUANT: CPT

## 2025-07-18 PROCEDURE — 82435 ASSAY OF BLOOD CHLORIDE: CPT

## 2025-07-18 PROCEDURE — 86850 RBC ANTIBODY SCREEN: CPT

## 2025-07-18 PROCEDURE — 84484 ASSAY OF TROPONIN QUANT: CPT

## 2025-07-18 PROCEDURE — 83930 ASSAY OF BLOOD OSMOLALITY: CPT

## 2025-07-18 PROCEDURE — 99233 SBSQ HOSP IP/OBS HIGH 50: CPT | Mod: GC

## 2025-07-18 PROCEDURE — 83605 ASSAY OF LACTIC ACID: CPT

## 2025-07-18 PROCEDURE — 86900 BLOOD TYPING SEROLOGIC ABO: CPT

## 2025-07-18 PROCEDURE — 84295 ASSAY OF SERUM SODIUM: CPT

## 2025-07-18 PROCEDURE — 80048 BASIC METABOLIC PNL TOTAL CA: CPT

## 2025-07-18 PROCEDURE — A9585: CPT

## 2025-07-18 PROCEDURE — 82330 ASSAY OF CALCIUM: CPT

## 2025-07-18 PROCEDURE — 83036 HEMOGLOBIN GLYCOSYLATED A1C: CPT

## 2025-07-18 PROCEDURE — 86147 CARDIOLIPIN ANTIBODY EA IG: CPT

## 2025-07-18 PROCEDURE — 84100 ASSAY OF PHOSPHORUS: CPT

## 2025-07-18 PROCEDURE — 82803 BLOOD GASES ANY COMBINATION: CPT

## 2025-07-18 PROCEDURE — 75561 CARDIAC MRI FOR MORPH W/DYE: CPT

## 2025-07-18 PROCEDURE — C8929: CPT

## 2025-07-18 RX ORDER — METOPROLOL SUCCINATE 50 MG/1
75 TABLET, EXTENDED RELEASE ORAL ONCE
Refills: 0 | Status: COMPLETED | OUTPATIENT
Start: 2025-07-18 | End: 2025-07-18

## 2025-07-18 RX ORDER — METOPROLOL SUCCINATE 50 MG/1
12.5 TABLET, EXTENDED RELEASE ORAL DAILY
Refills: 0 | Status: DISCONTINUED | OUTPATIENT
Start: 2025-07-18 | End: 2025-07-19

## 2025-07-18 RX ADMIN — Medication 10 MILLIGRAM(S): at 21:33

## 2025-07-18 RX ADMIN — INSULIN LISPRO 1: 100 INJECTION, SOLUTION INTRAVENOUS; SUBCUTANEOUS at 17:41

## 2025-07-18 RX ADMIN — Medication 81 MILLIGRAM(S): at 11:53

## 2025-07-18 RX ADMIN — HEPARIN SODIUM 2200 UNIT(S)/HR: 1000 INJECTION INTRAVENOUS; SUBCUTANEOUS at 06:35

## 2025-07-18 RX ADMIN — HEPARIN SODIUM 2200 UNIT(S)/HR: 1000 INJECTION INTRAVENOUS; SUBCUTANEOUS at 19:23

## 2025-07-18 RX ADMIN — WHITE PETROLATUM 1 APPLICATION(S): 1 OINTMENT TOPICAL at 06:14

## 2025-07-18 RX ADMIN — INSULIN LISPRO 2 UNIT(S): 100 INJECTION, SOLUTION INTRAVENOUS; SUBCUTANEOUS at 12:56

## 2025-07-18 RX ADMIN — HEPARIN SODIUM 2200 UNIT(S)/HR: 1000 INJECTION INTRAVENOUS; SUBCUTANEOUS at 08:15

## 2025-07-18 RX ADMIN — METOPROLOL SUCCINATE 12.5 MILLIGRAM(S): 50 TABLET, EXTENDED RELEASE ORAL at 11:53

## 2025-07-18 RX ADMIN — INSULIN LISPRO 2 UNIT(S): 100 INJECTION, SOLUTION INTRAVENOUS; SUBCUTANEOUS at 08:10

## 2025-07-18 RX ADMIN — INSULIN LISPRO 1: 100 INJECTION, SOLUTION INTRAVENOUS; SUBCUTANEOUS at 12:57

## 2025-07-18 RX ADMIN — METOPROLOL SUCCINATE 75 MILLIGRAM(S): 50 TABLET, EXTENDED RELEASE ORAL at 13:31

## 2025-07-18 RX ADMIN — INSULIN LISPRO 2 UNIT(S): 100 INJECTION, SOLUTION INTRAVENOUS; SUBCUTANEOUS at 17:38

## 2025-07-18 RX ADMIN — ATORVASTATIN CALCIUM 10 MILLIGRAM(S): 80 TABLET, FILM COATED ORAL at 21:33

## 2025-07-18 RX ADMIN — WHITE PETROLATUM 1 APPLICATION(S): 1 OINTMENT TOPICAL at 13:16

## 2025-07-18 RX ADMIN — INSULIN GLARGINE-YFGN 17 UNIT(S): 100 INJECTION, SOLUTION SUBCUTANEOUS at 21:33

## 2025-07-19 LAB
ADD ON TEST-SPECIMEN IN LAB: SIGNIFICANT CHANGE UP
APTT BLD: 84.1 SEC — HIGH (ref 26.1–36.8)
GLUCOSE BLDC GLUCOMTR-MCNC: 170 MG/DL — HIGH (ref 70–99)
GLUCOSE BLDC GLUCOMTR-MCNC: 178 MG/DL — HIGH (ref 70–99)
GLUCOSE BLDC GLUCOMTR-MCNC: 204 MG/DL — HIGH (ref 70–99)
GLUCOSE BLDC GLUCOMTR-MCNC: 219 MG/DL — HIGH (ref 70–99)
HCT VFR BLD CALC: 34.5 % — SIGNIFICANT CHANGE UP (ref 34.5–45)
HGB BLD-MCNC: 10.4 G/DL — LOW (ref 11.5–15.5)
INR BLD: 1.35 RATIO — HIGH (ref 0.85–1.16)
MCHC RBC-ENTMCNC: 23.6 PG — LOW (ref 27–34)
MCHC RBC-ENTMCNC: 30.1 G/DL — LOW (ref 32–36)
MCV RBC AUTO: 78.2 FL — LOW (ref 80–100)
NRBC # BLD AUTO: 0 K/UL — SIGNIFICANT CHANGE UP (ref 0–0)
NRBC # FLD: 0 K/UL — SIGNIFICANT CHANGE UP (ref 0–0)
NRBC BLD AUTO-RTO: 0 /100 WBCS — SIGNIFICANT CHANGE UP (ref 0–0)
PLATELET # BLD AUTO: 406 K/UL — HIGH (ref 150–400)
PMV BLD: 10.6 FL — SIGNIFICANT CHANGE UP (ref 7–13)
PROTHROM AB SERPL-ACNC: 15.5 SEC — HIGH (ref 9.9–13.4)
RBC # BLD: 4.41 M/UL — SIGNIFICANT CHANGE UP (ref 3.8–5.2)
RBC # FLD: 17.3 % — HIGH (ref 10.3–14.5)
WBC # BLD: 5.41 K/UL — SIGNIFICANT CHANGE UP (ref 3.8–10.5)
WBC # FLD AUTO: 5.41 K/UL — SIGNIFICANT CHANGE UP (ref 3.8–10.5)

## 2025-07-19 PROCEDURE — 99233 SBSQ HOSP IP/OBS HIGH 50: CPT | Mod: GC

## 2025-07-19 RX ORDER — METOPROLOL SUCCINATE 50 MG/1
25 TABLET, EXTENDED RELEASE ORAL DAILY
Refills: 0 | Status: DISCONTINUED | OUTPATIENT
Start: 2025-07-19 | End: 2025-07-21

## 2025-07-19 RX ADMIN — INSULIN LISPRO 1: 100 INJECTION, SOLUTION INTRAVENOUS; SUBCUTANEOUS at 08:03

## 2025-07-19 RX ADMIN — INSULIN LISPRO 2: 100 INJECTION, SOLUTION INTRAVENOUS; SUBCUTANEOUS at 12:48

## 2025-07-19 RX ADMIN — INSULIN LISPRO 2 UNIT(S): 100 INJECTION, SOLUTION INTRAVENOUS; SUBCUTANEOUS at 12:48

## 2025-07-19 RX ADMIN — Medication 650 MILLIGRAM(S): at 07:54

## 2025-07-19 RX ADMIN — METOPROLOL SUCCINATE 25 MILLIGRAM(S): 50 TABLET, EXTENDED RELEASE ORAL at 12:01

## 2025-07-19 RX ADMIN — WHITE PETROLATUM 1 APPLICATION(S): 1 OINTMENT TOPICAL at 05:52

## 2025-07-19 RX ADMIN — METOPROLOL SUCCINATE 12.5 MILLIGRAM(S): 50 TABLET, EXTENDED RELEASE ORAL at 06:15

## 2025-07-19 RX ADMIN — INSULIN LISPRO 1: 100 INJECTION, SOLUTION INTRAVENOUS; SUBCUTANEOUS at 17:59

## 2025-07-19 RX ADMIN — INSULIN LISPRO 2 UNIT(S): 100 INJECTION, SOLUTION INTRAVENOUS; SUBCUTANEOUS at 08:03

## 2025-07-19 RX ADMIN — WHITE PETROLATUM 1 APPLICATION(S): 1 OINTMENT TOPICAL at 22:36

## 2025-07-19 RX ADMIN — INSULIN GLARGINE-YFGN 17 UNIT(S): 100 INJECTION, SOLUTION SUBCUTANEOUS at 22:18

## 2025-07-19 RX ADMIN — INSULIN LISPRO 2 UNIT(S): 100 INJECTION, SOLUTION INTRAVENOUS; SUBCUTANEOUS at 17:59

## 2025-07-19 RX ADMIN — Medication 10 MILLIGRAM(S): at 22:19

## 2025-07-19 RX ADMIN — WHITE PETROLATUM 1 APPLICATION(S): 1 OINTMENT TOPICAL at 14:33

## 2025-07-19 RX ADMIN — Medication 650 MILLIGRAM(S): at 10:49

## 2025-07-19 RX ADMIN — HEPARIN SODIUM 2200 UNIT(S)/HR: 1000 INJECTION INTRAVENOUS; SUBCUTANEOUS at 07:53

## 2025-07-19 RX ADMIN — WHITE PETROLATUM 1 APPLICATION(S): 1 OINTMENT TOPICAL at 00:52

## 2025-07-19 RX ADMIN — ATORVASTATIN CALCIUM 10 MILLIGRAM(S): 80 TABLET, FILM COATED ORAL at 22:18

## 2025-07-19 RX ADMIN — Medication 81 MILLIGRAM(S): at 12:01

## 2025-07-20 LAB
ANION GAP SERPL CALC-SCNC: 14 MMOL/L — SIGNIFICANT CHANGE UP (ref 5–17)
APTT BLD: 92.2 SEC — HIGH (ref 26.1–36.8)
BUN SERPL-MCNC: 7 MG/DL — SIGNIFICANT CHANGE UP (ref 7–23)
CALCIUM SERPL-MCNC: 9.6 MG/DL — SIGNIFICANT CHANGE UP (ref 8.4–10.5)
CHLORIDE SERPL-SCNC: 102 MMOL/L — SIGNIFICANT CHANGE UP (ref 96–108)
CO2 SERPL-SCNC: 21 MMOL/L — LOW (ref 22–31)
CREAT SERPL-MCNC: 0.71 MG/DL — SIGNIFICANT CHANGE UP (ref 0.5–1.3)
EGFR: 107 ML/MIN/1.73M2 — SIGNIFICANT CHANGE UP
EGFR: 107 ML/MIN/1.73M2 — SIGNIFICANT CHANGE UP
GLUCOSE BLDC GLUCOMTR-MCNC: 135 MG/DL — HIGH (ref 70–99)
GLUCOSE BLDC GLUCOMTR-MCNC: 181 MG/DL — HIGH (ref 70–99)
GLUCOSE BLDC GLUCOMTR-MCNC: 203 MG/DL — HIGH (ref 70–99)
GLUCOSE BLDC GLUCOMTR-MCNC: 241 MG/DL — HIGH (ref 70–99)
GLUCOSE SERPL-MCNC: 151 MG/DL — HIGH (ref 70–99)
HCT VFR BLD CALC: 33.8 % — LOW (ref 34.5–45)
HGB BLD-MCNC: 10.4 G/DL — LOW (ref 11.5–15.5)
INR BLD: 1.65 RATIO — HIGH (ref 0.85–1.16)
MAGNESIUM SERPL-MCNC: 1.6 MG/DL — SIGNIFICANT CHANGE UP (ref 1.6–2.6)
MCHC RBC-ENTMCNC: 24 PG — LOW (ref 27–34)
MCHC RBC-ENTMCNC: 30.8 G/DL — LOW (ref 32–36)
MCV RBC AUTO: 78.1 FL — LOW (ref 80–100)
NRBC # BLD AUTO: 0 K/UL — SIGNIFICANT CHANGE UP (ref 0–0)
NRBC # FLD: 0 K/UL — SIGNIFICANT CHANGE UP (ref 0–0)
NRBC BLD AUTO-RTO: 0 /100 WBCS — SIGNIFICANT CHANGE UP (ref 0–0)
PHOSPHATE SERPL-MCNC: 3.3 MG/DL — SIGNIFICANT CHANGE UP (ref 2.5–4.5)
PLATELET # BLD AUTO: 411 K/UL — HIGH (ref 150–400)
PMV BLD: 11.3 FL — SIGNIFICANT CHANGE UP (ref 7–13)
POTASSIUM SERPL-MCNC: 3.7 MMOL/L — SIGNIFICANT CHANGE UP (ref 3.5–5.3)
POTASSIUM SERPL-SCNC: 3.7 MMOL/L — SIGNIFICANT CHANGE UP (ref 3.5–5.3)
PROTHROM AB SERPL-ACNC: 18.7 SEC — HIGH (ref 9.9–13.4)
RBC # BLD: 4.33 M/UL — SIGNIFICANT CHANGE UP (ref 3.8–5.2)
RBC # FLD: 17.2 % — HIGH (ref 10.3–14.5)
SODIUM SERPL-SCNC: 137 MMOL/L — SIGNIFICANT CHANGE UP (ref 135–145)
WBC # BLD: 5.08 K/UL — SIGNIFICANT CHANGE UP (ref 3.8–10.5)
WBC # FLD AUTO: 5.08 K/UL — SIGNIFICANT CHANGE UP (ref 3.8–10.5)

## 2025-07-20 PROCEDURE — 99233 SBSQ HOSP IP/OBS HIGH 50: CPT | Mod: GC

## 2025-07-20 RX ADMIN — HEPARIN SODIUM 2200 UNIT(S)/HR: 1000 INJECTION INTRAVENOUS; SUBCUTANEOUS at 05:04

## 2025-07-20 RX ADMIN — HEPARIN SODIUM 2200 UNIT(S)/HR: 1000 INJECTION INTRAVENOUS; SUBCUTANEOUS at 08:26

## 2025-07-20 RX ADMIN — WHITE PETROLATUM 1 APPLICATION(S): 1 OINTMENT TOPICAL at 13:11

## 2025-07-20 RX ADMIN — METOPROLOL SUCCINATE 25 MILLIGRAM(S): 50 TABLET, EXTENDED RELEASE ORAL at 05:06

## 2025-07-20 RX ADMIN — INSULIN LISPRO 2 UNIT(S): 100 INJECTION, SOLUTION INTRAVENOUS; SUBCUTANEOUS at 13:10

## 2025-07-20 RX ADMIN — Medication 10 MILLIGRAM(S): at 21:19

## 2025-07-20 RX ADMIN — INSULIN GLARGINE-YFGN 17 UNIT(S): 100 INJECTION, SOLUTION SUBCUTANEOUS at 21:19

## 2025-07-20 RX ADMIN — INSULIN LISPRO 1: 100 INJECTION, SOLUTION INTRAVENOUS; SUBCUTANEOUS at 08:55

## 2025-07-20 RX ADMIN — WHITE PETROLATUM 1 APPLICATION(S): 1 OINTMENT TOPICAL at 21:39

## 2025-07-20 RX ADMIN — INSULIN LISPRO 2 UNIT(S): 100 INJECTION, SOLUTION INTRAVENOUS; SUBCUTANEOUS at 17:12

## 2025-07-20 RX ADMIN — INSULIN LISPRO 2: 100 INJECTION, SOLUTION INTRAVENOUS; SUBCUTANEOUS at 13:09

## 2025-07-20 RX ADMIN — HEPARIN SODIUM 2200 UNIT(S)/HR: 1000 INJECTION INTRAVENOUS; SUBCUTANEOUS at 17:11

## 2025-07-20 RX ADMIN — WHITE PETROLATUM 1 APPLICATION(S): 1 OINTMENT TOPICAL at 06:56

## 2025-07-20 RX ADMIN — ATORVASTATIN CALCIUM 10 MILLIGRAM(S): 80 TABLET, FILM COATED ORAL at 21:19

## 2025-07-20 RX ADMIN — INSULIN LISPRO 2 UNIT(S): 100 INJECTION, SOLUTION INTRAVENOUS; SUBCUTANEOUS at 08:55

## 2025-07-20 RX ADMIN — Medication 81 MILLIGRAM(S): at 11:21

## 2025-07-21 ENCOUNTER — TRANSCRIPTION ENCOUNTER (OUTPATIENT)
Age: 45
End: 2025-07-21

## 2025-07-21 VITALS
RESPIRATION RATE: 18 BRPM | SYSTOLIC BLOOD PRESSURE: 130 MMHG | DIASTOLIC BLOOD PRESSURE: 80 MMHG | TEMPERATURE: 98 F | HEART RATE: 75 BPM | OXYGEN SATURATION: 96 %

## 2025-07-21 LAB
ANION GAP SERPL CALC-SCNC: 13 MMOL/L — SIGNIFICANT CHANGE UP (ref 5–17)
APTT BLD: 139.3 SEC — CRITICAL HIGH (ref 26.1–36.8)
BUN SERPL-MCNC: 7 MG/DL — SIGNIFICANT CHANGE UP (ref 7–23)
CALCIUM SERPL-MCNC: 9.6 MG/DL — SIGNIFICANT CHANGE UP (ref 8.4–10.5)
CHLORIDE SERPL-SCNC: 103 MMOL/L — SIGNIFICANT CHANGE UP (ref 96–108)
CO2 SERPL-SCNC: 22 MMOL/L — SIGNIFICANT CHANGE UP (ref 22–31)
CREAT SERPL-MCNC: 0.79 MG/DL — SIGNIFICANT CHANGE UP (ref 0.5–1.3)
EGFR: 94 ML/MIN/1.73M2 — SIGNIFICANT CHANGE UP
EGFR: 94 ML/MIN/1.73M2 — SIGNIFICANT CHANGE UP
GLUCOSE BLDC GLUCOMTR-MCNC: 152 MG/DL — HIGH (ref 70–99)
GLUCOSE BLDC GLUCOMTR-MCNC: 165 MG/DL — HIGH (ref 70–99)
GLUCOSE BLDC GLUCOMTR-MCNC: 193 MG/DL — HIGH (ref 70–99)
GLUCOSE SERPL-MCNC: 156 MG/DL — HIGH (ref 70–99)
HCT VFR BLD CALC: 34.4 % — LOW (ref 34.5–45)
HGB BLD-MCNC: 10.7 G/DL — LOW (ref 11.5–15.5)
INR BLD: 2 RATIO — HIGH (ref 0.85–1.16)
MAGNESIUM SERPL-MCNC: 1.4 MG/DL — LOW (ref 1.6–2.6)
MCHC RBC-ENTMCNC: 24.1 PG — LOW (ref 27–34)
MCHC RBC-ENTMCNC: 31.1 G/DL — LOW (ref 32–36)
MCV RBC AUTO: 77.5 FL — LOW (ref 80–100)
NRBC # BLD AUTO: 0 K/UL — SIGNIFICANT CHANGE UP (ref 0–0)
NRBC # FLD: 0 K/UL — SIGNIFICANT CHANGE UP (ref 0–0)
NRBC BLD AUTO-RTO: 0 /100 WBCS — SIGNIFICANT CHANGE UP (ref 0–0)
PHOSPHATE SERPL-MCNC: 3.4 MG/DL — SIGNIFICANT CHANGE UP (ref 2.5–4.5)
PLATELET # BLD AUTO: 442 K/UL — HIGH (ref 150–400)
PMV BLD: 11 FL — SIGNIFICANT CHANGE UP (ref 7–13)
POTASSIUM SERPL-MCNC: 4 MMOL/L — SIGNIFICANT CHANGE UP (ref 3.5–5.3)
POTASSIUM SERPL-SCNC: 4 MMOL/L — SIGNIFICANT CHANGE UP (ref 3.5–5.3)
PROTHROM AB SERPL-ACNC: 22.6 SEC — HIGH (ref 9.9–13.4)
RBC # BLD: 4.44 M/UL — SIGNIFICANT CHANGE UP (ref 3.8–5.2)
RBC # FLD: 17.2 % — HIGH (ref 10.3–14.5)
SODIUM SERPL-SCNC: 138 MMOL/L — SIGNIFICANT CHANGE UP (ref 135–145)
WBC # BLD: 5.39 K/UL — SIGNIFICANT CHANGE UP (ref 3.8–10.5)
WBC # FLD AUTO: 5.39 K/UL — SIGNIFICANT CHANGE UP (ref 3.8–10.5)

## 2025-07-21 PROCEDURE — 86901 BLOOD TYPING SEROLOGIC RH(D): CPT

## 2025-07-21 PROCEDURE — 93308 TTE F-UP OR LMTD: CPT

## 2025-07-21 PROCEDURE — 80061 LIPID PANEL: CPT

## 2025-07-21 PROCEDURE — 86850 RBC ANTIBODY SCREEN: CPT

## 2025-07-21 PROCEDURE — 82962 GLUCOSE BLOOD TEST: CPT

## 2025-07-21 PROCEDURE — 85730 THROMBOPLASTIN TIME PARTIAL: CPT

## 2025-07-21 PROCEDURE — C8929: CPT

## 2025-07-21 PROCEDURE — 74177 CT ABD & PELVIS W/CONTRAST: CPT

## 2025-07-21 PROCEDURE — 82435 ASSAY OF BLOOD CHLORIDE: CPT

## 2025-07-21 PROCEDURE — 75574 CT ANGIO HRT W/3D IMAGE: CPT

## 2025-07-21 PROCEDURE — 36415 COLL VENOUS BLD VENIPUNCTURE: CPT

## 2025-07-21 PROCEDURE — 83036 HEMOGLOBIN GLYCOSYLATED A1C: CPT

## 2025-07-21 PROCEDURE — 85014 HEMATOCRIT: CPT

## 2025-07-21 PROCEDURE — 86146 BETA-2 GLYCOPROTEIN ANTIBODY: CPT

## 2025-07-21 PROCEDURE — 80048 BASIC METABOLIC PNL TOTAL CA: CPT

## 2025-07-21 PROCEDURE — 99239 HOSP IP/OBS DSCHRG MGMT >30: CPT | Mod: GC

## 2025-07-21 PROCEDURE — 85610 PROTHROMBIN TIME: CPT

## 2025-07-21 PROCEDURE — 81241 F5 GENE: CPT

## 2025-07-21 PROCEDURE — 81240 F2 GENE: CPT

## 2025-07-21 PROCEDURE — 85027 COMPLETE CBC AUTOMATED: CPT

## 2025-07-21 PROCEDURE — 96372 THER/PROPH/DIAG INJ SC/IM: CPT

## 2025-07-21 PROCEDURE — 84132 ASSAY OF SERUM POTASSIUM: CPT

## 2025-07-21 PROCEDURE — 83735 ASSAY OF MAGNESIUM: CPT

## 2025-07-21 PROCEDURE — 84100 ASSAY OF PHOSPHORUS: CPT

## 2025-07-21 PROCEDURE — 83605 ASSAY OF LACTIC ACID: CPT

## 2025-07-21 PROCEDURE — 83930 ASSAY OF BLOOD OSMOLALITY: CPT

## 2025-07-21 PROCEDURE — 83880 ASSAY OF NATRIURETIC PEPTIDE: CPT

## 2025-07-21 PROCEDURE — 86900 BLOOD TYPING SEROLOGIC ABO: CPT

## 2025-07-21 PROCEDURE — 80053 COMPREHEN METABOLIC PANEL: CPT

## 2025-07-21 PROCEDURE — 75574 CT ANGIO HRT W/3D IMAGE: CPT | Mod: 26

## 2025-07-21 PROCEDURE — 85613 RUSSELL VIPER VENOM DILUTED: CPT

## 2025-07-21 PROCEDURE — 99285 EMERGENCY DEPT VISIT HI MDM: CPT | Mod: 25

## 2025-07-21 PROCEDURE — 84295 ASSAY OF SERUM SODIUM: CPT

## 2025-07-21 PROCEDURE — 82330 ASSAY OF CALCIUM: CPT

## 2025-07-21 PROCEDURE — 82803 BLOOD GASES ANY COMBINATION: CPT

## 2025-07-21 PROCEDURE — 83935 ASSAY OF URINE OSMOLALITY: CPT

## 2025-07-21 PROCEDURE — 99233 SBSQ HOSP IP/OBS HIGH 50: CPT

## 2025-07-21 PROCEDURE — 75561 CARDIAC MRI FOR MORPH W/DYE: CPT

## 2025-07-21 PROCEDURE — 85025 COMPLETE CBC W/AUTO DIFF WBC: CPT

## 2025-07-21 PROCEDURE — 86147 CARDIOLIPIN ANTIBODY EA IG: CPT

## 2025-07-21 PROCEDURE — 82947 ASSAY GLUCOSE BLOOD QUANT: CPT

## 2025-07-21 PROCEDURE — A9585: CPT

## 2025-07-21 PROCEDURE — 93005 ELECTROCARDIOGRAM TRACING: CPT

## 2025-07-21 PROCEDURE — 85018 HEMOGLOBIN: CPT

## 2025-07-21 PROCEDURE — 84484 ASSAY OF TROPONIN QUANT: CPT

## 2025-07-21 RX ORDER — INSULIN GLARGINE-YFGN 100 [IU]/ML
15 INJECTION, SOLUTION SUBCUTANEOUS
Qty: 2 | Refills: 0
Start: 2025-07-21 | End: 2025-08-19

## 2025-07-21 RX ORDER — MAGNESIUM SULFATE 500 MG/ML
2 SYRINGE (ML) INJECTION ONCE
Refills: 0 | Status: COMPLETED | OUTPATIENT
Start: 2025-07-21 | End: 2025-07-21

## 2025-07-21 RX ORDER — METFORMIN HYDROCHLORIDE 850 MG/1
1 TABLET ORAL
Refills: 0 | DISCHARGE

## 2025-07-21 RX ORDER — INSULIN LISPRO 100 U/ML
3 INJECTION, SOLUTION INTRAVENOUS; SUBCUTANEOUS
Qty: 1 | Refills: 0
Start: 2025-07-21 | End: 2025-08-19

## 2025-07-21 RX ORDER — TIRZEPATIDE 7.5 MG/.5ML
2.5 INJECTION, SOLUTION SUBCUTANEOUS
Qty: 8 | Refills: 0
Start: 2025-07-21 | End: 2025-08-19

## 2025-07-21 RX ORDER — METOPROLOL SUCCINATE 50 MG/1
50 TABLET, EXTENDED RELEASE ORAL ONCE
Refills: 0 | Status: COMPLETED | OUTPATIENT
Start: 2025-07-21 | End: 2025-07-21

## 2025-07-21 RX ORDER — METFORMIN HYDROCHLORIDE 850 MG/1
2 TABLET ORAL
Qty: 120 | Refills: 0
Start: 2025-07-21 | End: 2025-08-19

## 2025-07-21 RX ORDER — INSULIN LISPRO 100 U/ML
3 INJECTION, SOLUTION INTRAVENOUS; SUBCUTANEOUS
Qty: 3 | Refills: 0
Start: 2025-07-21 | End: 2025-08-19

## 2025-07-21 RX ORDER — METOPROLOL SUCCINATE 50 MG/1
1 TABLET, EXTENDED RELEASE ORAL
Qty: 30 | Refills: 0
Start: 2025-07-21 | End: 2025-08-19

## 2025-07-21 RX ORDER — METOPROLOL SUCCINATE 50 MG/1
1 TABLET, EXTENDED RELEASE ORAL
Refills: 0 | DISCHARGE

## 2025-07-21 RX ORDER — INSULIN LISPRO 100 U/ML
3 INJECTION, SOLUTION INTRAVENOUS; SUBCUTANEOUS
Refills: 0 | Status: DISCONTINUED | OUTPATIENT
Start: 2025-07-21 | End: 2025-07-21

## 2025-07-21 RX ORDER — ISOPROPYL ALCOHOL, BENZOCAINE .7; .06 ML/ML; ML/ML
0 SWAB TOPICAL
Qty: 100 | Refills: 1
Start: 2025-07-21

## 2025-07-21 RX ADMIN — HEPARIN SODIUM 2200 UNIT(S)/HR: 1000 INJECTION INTRAVENOUS; SUBCUTANEOUS at 05:40

## 2025-07-21 RX ADMIN — INSULIN LISPRO 2 UNIT(S): 100 INJECTION, SOLUTION INTRAVENOUS; SUBCUTANEOUS at 13:43

## 2025-07-21 RX ADMIN — METOPROLOL SUCCINATE 25 MILLIGRAM(S): 50 TABLET, EXTENDED RELEASE ORAL at 05:40

## 2025-07-21 RX ADMIN — Medication 25 GRAM(S): at 09:32

## 2025-07-21 RX ADMIN — HEPARIN SODIUM 0 UNIT(S)/HR: 1000 INJECTION INTRAVENOUS; SUBCUTANEOUS at 08:13

## 2025-07-21 RX ADMIN — INSULIN LISPRO 1: 100 INJECTION, SOLUTION INTRAVENOUS; SUBCUTANEOUS at 17:39

## 2025-07-21 RX ADMIN — INSULIN LISPRO 1: 100 INJECTION, SOLUTION INTRAVENOUS; SUBCUTANEOUS at 08:46

## 2025-07-21 RX ADMIN — METOPROLOL SUCCINATE 50 MILLIGRAM(S): 50 TABLET, EXTENDED RELEASE ORAL at 10:05

## 2025-07-21 RX ADMIN — INSULIN LISPRO 2 UNIT(S): 100 INJECTION, SOLUTION INTRAVENOUS; SUBCUTANEOUS at 08:46

## 2025-07-21 RX ADMIN — WHITE PETROLATUM 1 APPLICATION(S): 1 OINTMENT TOPICAL at 06:28

## 2025-07-21 RX ADMIN — WHITE PETROLATUM 1 APPLICATION(S): 1 OINTMENT TOPICAL at 13:43

## 2025-07-21 RX ADMIN — INSULIN LISPRO 3 UNIT(S): 100 INJECTION, SOLUTION INTRAVENOUS; SUBCUTANEOUS at 17:39

## 2025-07-21 RX ADMIN — Medication 81 MILLIGRAM(S): at 12:01

## 2025-07-21 RX ADMIN — INSULIN LISPRO 1: 100 INJECTION, SOLUTION INTRAVENOUS; SUBCUTANEOUS at 13:42

## 2025-07-22 DIAGNOSIS — I51.3 INTRACARDIAC THROMBOSIS, NOT ELSEWHERE CLASSIFIED: ICD-10-CM

## 2025-07-23 PROBLEM — E78.5 HYPERLIPIDEMIA, UNSPECIFIED: Chronic | Status: ACTIVE | Noted: 2025-07-14

## 2025-07-23 PROBLEM — I25.10 ATHEROSCLEROTIC HEART DISEASE OF NATIVE CORONARY ARTERY WITHOUT ANGINA PECTORIS: Chronic | Status: ACTIVE | Noted: 2025-07-14

## 2025-07-23 PROBLEM — I26.99 OTHER PULMONARY EMBOLISM WITHOUT ACUTE COR PULMONALE: Chronic | Status: ACTIVE | Noted: 2025-07-14
